# Patient Record
Sex: FEMALE | Race: WHITE | NOT HISPANIC OR LATINO | URBAN - METROPOLITAN AREA
[De-identification: names, ages, dates, MRNs, and addresses within clinical notes are randomized per-mention and may not be internally consistent; named-entity substitution may affect disease eponyms.]

---

## 2017-10-03 ENCOUNTER — INPATIENT (INPATIENT)
Facility: HOSPITAL | Age: 82
LOS: 6 days | Discharge: NURSING, REHAB OR LONG-TERM CARE | DRG: 683 | End: 2017-10-10
Attending: INTERNAL MEDICINE | Admitting: INTERNAL MEDICINE
Payer: COMMERCIAL

## 2017-10-03 VITALS
SYSTOLIC BLOOD PRESSURE: 114 MMHG | HEART RATE: 86 BPM | OXYGEN SATURATION: 96 % | DIASTOLIC BLOOD PRESSURE: 75 MMHG | TEMPERATURE: 98 F | HEIGHT: 64 IN | RESPIRATION RATE: 15 BRPM | WEIGHT: 179.9 LBS

## 2017-10-03 DIAGNOSIS — E11.649 TYPE 2 DIABETES MELLITUS WITH HYPOGLYCEMIA WITHOUT COMA: ICD-10-CM

## 2017-10-03 LAB
ALBUMIN SERPL ELPH-MCNC: 3.2 G/DL — LOW (ref 3.3–5)
ALP SERPL-CCNC: 26 U/L — LOW (ref 40–120)
ALT FLD-CCNC: 13 U/L — SIGNIFICANT CHANGE UP (ref 12–78)
ANION GAP SERPL CALC-SCNC: 6 MMOL/L — SIGNIFICANT CHANGE UP (ref 5–17)
APPEARANCE UR: CLEAR — SIGNIFICANT CHANGE UP
AST SERPL-CCNC: 23 U/L — SIGNIFICANT CHANGE UP (ref 15–37)
BACTERIA # UR AUTO: ABNORMAL
BASOPHILS # BLD AUTO: 0.1 K/UL — SIGNIFICANT CHANGE UP (ref 0–0.2)
BASOPHILS NFR BLD AUTO: 0.9 % — SIGNIFICANT CHANGE UP (ref 0–2)
BILIRUB SERPL-MCNC: 0.5 MG/DL — SIGNIFICANT CHANGE UP (ref 0.2–1.2)
BILIRUB UR-MCNC: NEGATIVE — SIGNIFICANT CHANGE UP
BUN SERPL-MCNC: 33 MG/DL — HIGH (ref 7–23)
CALCIUM SERPL-MCNC: 8.6 MG/DL — SIGNIFICANT CHANGE UP (ref 8.5–10.1)
CHLORIDE SERPL-SCNC: 111 MMOL/L — HIGH (ref 96–108)
CO2 SERPL-SCNC: 26 MMOL/L — SIGNIFICANT CHANGE UP (ref 22–31)
COLOR SPEC: YELLOW — SIGNIFICANT CHANGE UP
CREAT SERPL-MCNC: 3 MG/DL — HIGH (ref 0.5–1.3)
DIFF PNL FLD: ABNORMAL
EOSINOPHIL # BLD AUTO: 0 K/UL — SIGNIFICANT CHANGE UP (ref 0–0.5)
EOSINOPHIL NFR BLD AUTO: 0.4 % — SIGNIFICANT CHANGE UP (ref 0–6)
EPI CELLS # UR: ABNORMAL
GLUCOSE SERPL-MCNC: 178 MG/DL — HIGH (ref 70–99)
GLUCOSE UR QL: NEGATIVE — SIGNIFICANT CHANGE UP
HCT VFR BLD CALC: 32.6 % — LOW (ref 34.5–45)
HGB BLD-MCNC: 10.1 G/DL — LOW (ref 11.5–15.5)
KETONES UR-MCNC: NEGATIVE — SIGNIFICANT CHANGE UP
LACTATE SERPL-SCNC: 1.7 MMOL/L — SIGNIFICANT CHANGE UP (ref 0.7–2)
LEUKOCYTE ESTERASE UR-ACNC: ABNORMAL
LYMPHOCYTES # BLD AUTO: 1.6 K/UL — SIGNIFICANT CHANGE UP (ref 1–3.3)
LYMPHOCYTES # BLD AUTO: 18.3 % — SIGNIFICANT CHANGE UP (ref 13–44)
MCHC RBC-ENTMCNC: 30 PG — SIGNIFICANT CHANGE UP (ref 27–34)
MCHC RBC-ENTMCNC: 31 GM/DL — LOW (ref 32–36)
MCV RBC AUTO: 96.8 FL — SIGNIFICANT CHANGE UP (ref 80–100)
MONOCYTES # BLD AUTO: 0.4 K/UL — SIGNIFICANT CHANGE UP (ref 0–0.9)
MONOCYTES NFR BLD AUTO: 4.1 % — SIGNIFICANT CHANGE UP (ref 1–9)
NEUTROPHILS # BLD AUTO: 6.8 K/UL — SIGNIFICANT CHANGE UP (ref 1.8–7.4)
NEUTROPHILS NFR BLD AUTO: 76.2 % — SIGNIFICANT CHANGE UP (ref 43–77)
NITRITE UR-MCNC: NEGATIVE — SIGNIFICANT CHANGE UP
PH UR: 5 — SIGNIFICANT CHANGE UP (ref 5–8)
PLATELET # BLD AUTO: 260 K/UL — SIGNIFICANT CHANGE UP (ref 150–400)
POTASSIUM SERPL-MCNC: 5.1 MMOL/L — SIGNIFICANT CHANGE UP (ref 3.5–5.3)
POTASSIUM SERPL-SCNC: 5.1 MMOL/L — SIGNIFICANT CHANGE UP (ref 3.5–5.3)
PROT SERPL-MCNC: 6.8 G/DL — SIGNIFICANT CHANGE UP (ref 6–8.3)
PROT UR-MCNC: 25 MG/DL
RBC # BLD: 3.37 M/UL — LOW (ref 3.8–5.2)
RBC # FLD: 14.8 % — HIGH (ref 10.3–14.5)
RBC CASTS # UR COMP ASSIST: ABNORMAL /HPF (ref 0–4)
SODIUM SERPL-SCNC: 143 MMOL/L — SIGNIFICANT CHANGE UP (ref 135–145)
SP GR SPEC: 1.02 — SIGNIFICANT CHANGE UP (ref 1.01–1.02)
TSH SERPL-MCNC: 3.45 UIU/ML — SIGNIFICANT CHANGE UP (ref 0.36–3.74)
UROBILINOGEN FLD QL: NEGATIVE — SIGNIFICANT CHANGE UP
WBC # BLD: 8.9 K/UL — SIGNIFICANT CHANGE UP (ref 3.8–10.5)
WBC # FLD AUTO: 8.9 K/UL — SIGNIFICANT CHANGE UP (ref 3.8–10.5)
WBC UR QL: >50

## 2017-10-03 PROCEDURE — 99285 EMERGENCY DEPT VISIT HI MDM: CPT

## 2017-10-03 PROCEDURE — 71010: CPT | Mod: 26

## 2017-10-03 PROCEDURE — 99223 1ST HOSP IP/OBS HIGH 75: CPT

## 2017-10-03 RX ORDER — CEFTRIAXONE 500 MG/1
1 INJECTION, POWDER, FOR SOLUTION INTRAMUSCULAR; INTRAVENOUS ONCE
Qty: 0 | Refills: 0 | Status: COMPLETED | OUTPATIENT
Start: 2017-10-03 | End: 2017-10-03

## 2017-10-03 RX ORDER — DEXTROSE 50 % IN WATER 50 %
25 SYRINGE (ML) INTRAVENOUS ONCE
Qty: 0 | Refills: 0 | Status: DISCONTINUED | OUTPATIENT
Start: 2017-10-03 | End: 2017-10-10

## 2017-10-03 RX ORDER — SODIUM CHLORIDE 9 MG/ML
1000 INJECTION, SOLUTION INTRAVENOUS
Qty: 0 | Refills: 0 | Status: DISCONTINUED | OUTPATIENT
Start: 2017-10-03 | End: 2017-10-04

## 2017-10-03 RX ORDER — GLUCAGON INJECTION, SOLUTION 0.5 MG/.1ML
1 INJECTION, SOLUTION SUBCUTANEOUS ONCE
Qty: 0 | Refills: 0 | Status: DISCONTINUED | OUTPATIENT
Start: 2017-10-03 | End: 2017-10-10

## 2017-10-03 RX ORDER — CEFTRIAXONE 500 MG/1
1 INJECTION, POWDER, FOR SOLUTION INTRAMUSCULAR; INTRAVENOUS EVERY 24 HOURS
Qty: 0 | Refills: 0 | Status: DISCONTINUED | OUTPATIENT
Start: 2017-10-04 | End: 2017-10-06

## 2017-10-03 RX ORDER — DEXTROSE 50 % IN WATER 50 %
12.5 SYRINGE (ML) INTRAVENOUS ONCE
Qty: 0 | Refills: 0 | Status: DISCONTINUED | OUTPATIENT
Start: 2017-10-03 | End: 2017-10-10

## 2017-10-03 RX ORDER — INFLUENZA VIRUS VACCINE 15; 15; 15; 15 UG/.5ML; UG/.5ML; UG/.5ML; UG/.5ML
0.5 SUSPENSION INTRAMUSCULAR ONCE
Qty: 0 | Refills: 0 | Status: COMPLETED | OUTPATIENT
Start: 2017-10-03 | End: 2017-10-05

## 2017-10-03 RX ORDER — LACTOBACILLUS ACIDOPHILUS 100MM CELL
1 CAPSULE ORAL
Qty: 0 | Refills: 0 | Status: DISCONTINUED | OUTPATIENT
Start: 2017-10-03 | End: 2017-10-10

## 2017-10-03 RX ORDER — DEXTROSE 50 % IN WATER 50 %
1 SYRINGE (ML) INTRAVENOUS ONCE
Qty: 0 | Refills: 0 | Status: DISCONTINUED | OUTPATIENT
Start: 2017-10-03 | End: 2017-10-10

## 2017-10-03 RX ORDER — ENOXAPARIN SODIUM 100 MG/ML
30 INJECTION SUBCUTANEOUS DAILY
Qty: 0 | Refills: 0 | Status: DISCONTINUED | OUTPATIENT
Start: 2017-10-03 | End: 2017-10-10

## 2017-10-03 RX ORDER — INSULIN LISPRO 100/ML
VIAL (ML) SUBCUTANEOUS
Qty: 0 | Refills: 0 | Status: DISCONTINUED | OUTPATIENT
Start: 2017-10-03 | End: 2017-10-04

## 2017-10-03 RX ORDER — LEVOTHYROXINE SODIUM 125 MCG
88 TABLET ORAL DAILY
Qty: 0 | Refills: 0 | Status: DISCONTINUED | OUTPATIENT
Start: 2017-10-03 | End: 2017-10-10

## 2017-10-03 RX ADMIN — SODIUM CHLORIDE 200 MILLILITER(S): 9 INJECTION, SOLUTION INTRAVENOUS at 13:35

## 2017-10-03 RX ADMIN — CEFTRIAXONE 100 GRAM(S): 500 INJECTION, POWDER, FOR SOLUTION INTRAMUSCULAR; INTRAVENOUS at 14:44

## 2017-10-03 RX ADMIN — SODIUM CHLORIDE 50 MILLILITER(S): 9 INJECTION, SOLUTION INTRAVENOUS at 22:24

## 2017-10-03 RX ADMIN — SODIUM CHLORIDE 200 MILLILITER(S): 9 INJECTION, SOLUTION INTRAVENOUS at 22:20

## 2017-10-03 NOTE — ED PROVIDER NOTE - OBJECTIVE STATEMENT
84 yo F with hx DM p/w had episode of hypoglycemia yest. EMS treated and PT RMA to go to hospital. Now pt with hypoglycemia at home, felt weak cw her low blood sugars. Pt's daughter then gave pt another inj of insulin. EMS found pt with low blood sugar, improved with D50. Pt now feeling well. No cp/sob/palp. no abd pain. No n/v/d. No recent illness. No numb/ting/focal weak. No recent trauma. No fall today. No other inj or co.

## 2017-10-03 NOTE — ED PROVIDER NOTE - ENMT, MLM
Airway patent, Nasal mucosa clear. Mouth with normal mucosa. Throat has no vesicles, no oropharyngeal exudates and uvula is midline. No ext signs of trauma.

## 2017-10-03 NOTE — H&P ADULT - PROBLEM SELECTOR PROBLEM 10
Coronary artery disease involving transplanted heart, angina presence unspecified, unspecified vessel or lesion type

## 2017-10-03 NOTE — H&P ADULT - ASSESSMENT
86 yo f ,  , lives with dtr , in her own apartment , former smoker , hx of diabetes mellitus type 2 , osteoarthritis , hypertension , osteoporosis , obesity , cabg , ppm  patient has had an episode of hypoglycemia last Sunday ems called to house and was given glucagon , then today confused , and with change in mental status , and with low fs glucometer test and brought to er , given d50 and glucagon , , later in er alert and responsive but fuggy ,   she takes 25 units Lantus bid , has not been by md in months , has difficulty ambulating , no n/v , no cp , no sob , no head aches , no rash , no fever , no chills , no dysuria ,   in er also abnl gfr , and anemia , 86 yo f ,  , lives with dtr , in her own apartment , former smoker , hx of diabetes mellitus type 2 , osteoarthritis , hypertension , osteoporosis , obesity , cabg , ppm, and severe MR   patient has had an episode of hypoglycemia last Sunday ems called to house and was given glucagon , then today confused , and with change in mental status , and with low fs glucometer test and brought to er , given d50 and recovered  , later in er alert and responsive but fuggy ,   she takes 25 units Lantus bid , has not been by md in months , has difficulty ambulating , no n/v , no cp , no sob , no head aches , no rash , no fever , no chills , no dysuria ,   in er also abnl gfr , and anemia ,

## 2017-10-03 NOTE — ED PROVIDER NOTE - CARE PLAN
Principal Discharge DX:	Diabetic hypoglycemia  Secondary Diagnosis:	Urinary tract infection without hematuria, site unspecified

## 2017-10-03 NOTE — ED PROVIDER NOTE - PROGRESS NOTE DETAILS
Nirmal Pts family - pt unable to care for self at home, will need placement. Pt otherwise doing well at this time. Nirmal Najera, will admit for recurrent hypoglycemia / UTI.

## 2017-10-03 NOTE — ED ADULT NURSE NOTE - OBJECTIVE STATEMENT
Pt presents to the Ed via ambulance s/p hypoglycemia, given an amp of D50 PTA via IV access on the right forearm, NS infusing. Pt awake and responsive, + sensation, + capillary refill < 3 sec, moving all of her extremities independently.

## 2017-10-03 NOTE — ED PROVIDER NOTE - CHPI ED SYMPTOMS NEG
no shortness of breath/no chills/no headache/no vomiting/no decreased eating/drinking/no fever/no abdominal pain/no diarrhea/no rash

## 2017-10-03 NOTE — H&P ADULT - PROBLEM SELECTOR PROBLEM 3
Type 2 diabetes mellitus with other circulatory complication, unspecified long term insulin use status

## 2017-10-03 NOTE — H&P ADULT - HISTORY OF PRESENT ILLNESS
84 yo f ,  , lives with dtr , in her own apartment , former smoker , hx of diabetes mellitus type 2 , osteoarthritis , hypertension , osteoporosis , obesity , cabg , ppm  patient has had an episode of hypoglycemia last Sunday ems called to house and was given glucagon , then today confused , and with change in mental status , and with low fs glucometer test and brought to er , given d50 and glucagon , , later in er alert and responsive but fuggy ,   she takes 25 units Lantus bid , has not been by md in months , has difficulty ambulating , no n/v , no cp , no sob , no head aches , no rash , no fever , no chills , no dysuria , 86 yo f ,  , lives with dtr , in her own apartment , former smoker , hx of diabetes mellitus type 2 , osteoarthritis , hypertension , osteoporosis , obesity , cabg , ppm, and severe MR  patient has had an episode of hypoglycemia last Sunday ems called to house and was given glucagon and recovered ,  , then today confused , and with change in mental status ,no syncope , and with low fs glucometer test and brought to er , given d50 and  , later in er alert and responsive but fuggy , eating some cookies ,   she takes 25 units Lantus bid , has not been by md in months , has difficulty ambulating , no n/v , no cp , no sob , no head aches , no rash , no fever , no chills , no dysuria ,

## 2017-10-03 NOTE — H&P ADULT - NSHPSOCIALHISTORY_GEN_ALL_CORE
, lives with dtr , has a basement apt , difficulty walking , dtrs help out , has a walker , does do her own insulin , former smoker , no etoh ,   HCP and advance directives rev , and dtr will bring in papers

## 2017-10-03 NOTE — CONSULT NOTE ADULT - ASSESSMENT
85 F w hx of CAD s/p CABG, HTN, DM, HLD, mild ICM, severe MR, hypothyroid, PPM presents for near syncope/AMS.  - AMS likely from hypoglycemia.   - Cont current DM meds.  - Abx for UTI  - Appear mildly vol ol. Lasix 40mg IV x1  - Will need repeat echo  - PT does not want to pursue a mitral clip  - Possibly with new AF. Check 12 lead albina. Needs PPM interrogation (medtronic)  - observe on remote tele  - Monitor and replete electrolytes. Keep K>4.0 and Mg>2.0.  - Further cardiac workup will depend on clinical course.  - All other workup per primary team. Will followup. 85 F w hx of CAD s/p CABG, HTN, DM, HLD, mild ICM, severe MR, hypothyroid, PPM presents for near syncope/AMS.  - AMS likely from hypoglycemia.   - Cont current DM meds.  - Abx for UTI  - Appear mildly vol ol. Lasix 40mg IV x1  - Will need repeat echo  - PT does not want to pursue a mitral clip  - Possibly with new AF. Check 12 lead albina. Needs PPM interrogation (medtronic)...if does have AF, will likely need full dose ac  - observe on remote tele  - Monitor and replete electrolytes. Keep K>4.0 and Mg>2.0.  - Further cardiac workup will depend on clinical course.  - All other workup per primary team. Will followup.

## 2017-10-04 ENCOUNTER — TRANSCRIPTION ENCOUNTER (OUTPATIENT)
Age: 82
End: 2017-10-04

## 2017-10-04 DIAGNOSIS — I10 ESSENTIAL (PRIMARY) HYPERTENSION: ICD-10-CM

## 2017-10-04 DIAGNOSIS — N18.4 CHRONIC KIDNEY DISEASE, STAGE 4 (SEVERE): ICD-10-CM

## 2017-10-04 DIAGNOSIS — E03.2 HYPOTHYROIDISM DUE TO MEDICAMENTS AND OTHER EXOGENOUS SUBSTANCES: ICD-10-CM

## 2017-10-04 DIAGNOSIS — I25.811 ATHEROSCLEROSIS OF NATIVE CORONARY ARTERY OF TRANSPLANTED HEART WITHOUT ANGINA PECTORIS: ICD-10-CM

## 2017-10-04 DIAGNOSIS — E11.59 TYPE 2 DIABETES MELLITUS WITH OTHER CIRCULATORY COMPLICATIONS: ICD-10-CM

## 2017-10-04 DIAGNOSIS — E78.00 PURE HYPERCHOLESTEROLEMIA, UNSPECIFIED: ICD-10-CM

## 2017-10-04 DIAGNOSIS — N39.0 URINARY TRACT INFECTION, SITE NOT SPECIFIED: ICD-10-CM

## 2017-10-04 DIAGNOSIS — N17.9 ACUTE KIDNEY FAILURE, UNSPECIFIED: ICD-10-CM

## 2017-10-04 DIAGNOSIS — M19.90 UNSPECIFIED OSTEOARTHRITIS, UNSPECIFIED SITE: ICD-10-CM

## 2017-10-04 DIAGNOSIS — Z95.0 PRESENCE OF CARDIAC PACEMAKER: ICD-10-CM

## 2017-10-04 DIAGNOSIS — E11.649 TYPE 2 DIABETES MELLITUS WITH HYPOGLYCEMIA WITHOUT COMA: ICD-10-CM

## 2017-10-04 LAB
ANION GAP SERPL CALC-SCNC: 9 MMOL/L — SIGNIFICANT CHANGE UP (ref 5–17)
BASOPHILS # BLD AUTO: 0.1 K/UL — SIGNIFICANT CHANGE UP (ref 0–0.2)
BASOPHILS NFR BLD AUTO: 1 % — SIGNIFICANT CHANGE UP (ref 0–2)
BUN SERPL-MCNC: 27 MG/DL — HIGH (ref 7–23)
CALCIUM SERPL-MCNC: 7.9 MG/DL — LOW (ref 8.5–10.1)
CHLORIDE SERPL-SCNC: 113 MMOL/L — HIGH (ref 96–108)
CO2 SERPL-SCNC: 24 MMOL/L — SIGNIFICANT CHANGE UP (ref 22–31)
CREAT SERPL-MCNC: 2.4 MG/DL — HIGH (ref 0.5–1.3)
EOSINOPHIL # BLD AUTO: 0.2 K/UL — SIGNIFICANT CHANGE UP (ref 0–0.5)
EOSINOPHIL NFR BLD AUTO: 2.3 % — SIGNIFICANT CHANGE UP (ref 0–6)
FERRITIN SERPL-MCNC: 67 NG/ML — SIGNIFICANT CHANGE UP (ref 15–150)
GLUCOSE SERPL-MCNC: 146 MG/DL — HIGH (ref 70–99)
HCT VFR BLD CALC: 29.5 % — LOW (ref 34.5–45)
HGB BLD-MCNC: 8.8 G/DL — LOW (ref 11.5–15.5)
IRON SATN MFR SERPL: 15 % — SIGNIFICANT CHANGE UP (ref 14–50)
IRON SATN MFR SERPL: 54 UG/DL — SIGNIFICANT CHANGE UP (ref 30–160)
LYMPHOCYTES # BLD AUTO: 2.2 K/UL — SIGNIFICANT CHANGE UP (ref 1–3.3)
LYMPHOCYTES # BLD AUTO: 25.6 % — SIGNIFICANT CHANGE UP (ref 13–44)
MCHC RBC-ENTMCNC: 29.4 PG — SIGNIFICANT CHANGE UP (ref 27–34)
MCHC RBC-ENTMCNC: 29.7 GM/DL — LOW (ref 32–36)
MCV RBC AUTO: 98.8 FL — SIGNIFICANT CHANGE UP (ref 80–100)
MONOCYTES # BLD AUTO: 0.7 K/UL — SIGNIFICANT CHANGE UP (ref 0–0.9)
MONOCYTES NFR BLD AUTO: 7.7 % — SIGNIFICANT CHANGE UP (ref 1–9)
NEUTROPHILS # BLD AUTO: 5.4 K/UL — SIGNIFICANT CHANGE UP (ref 1.8–7.4)
NEUTROPHILS NFR BLD AUTO: 63.4 % — SIGNIFICANT CHANGE UP (ref 43–77)
PLATELET # BLD AUTO: 191 K/UL — SIGNIFICANT CHANGE UP (ref 150–400)
POTASSIUM SERPL-MCNC: 4.7 MMOL/L — SIGNIFICANT CHANGE UP (ref 3.5–5.3)
POTASSIUM SERPL-SCNC: 4.7 MMOL/L — SIGNIFICANT CHANGE UP (ref 3.5–5.3)
RBC # BLD: 2.99 M/UL — LOW (ref 3.8–5.2)
RBC # BLD: 3.05 M/UL — LOW (ref 3.8–5.2)
RBC # FLD: 14.7 % — HIGH (ref 10.3–14.5)
RETICS #: 61.3 K/UL — SIGNIFICANT CHANGE UP (ref 25–125)
RETICS/RBC NFR: 2 % — SIGNIFICANT CHANGE UP (ref 0.5–2.5)
SODIUM SERPL-SCNC: 146 MMOL/L — HIGH (ref 135–145)
T3 SERPL-MCNC: 65 NG/DL — LOW (ref 80–200)
T4 AB SER-ACNC: 8.7 UG/DL — SIGNIFICANT CHANGE UP (ref 4.6–12)
TIBC SERPL-MCNC: 361 UG/DL — SIGNIFICANT CHANGE UP (ref 220–430)
UIBC SERPL-MCNC: 307 UG/DL — SIGNIFICANT CHANGE UP (ref 110–370)
WBC # BLD: 8.6 K/UL — SIGNIFICANT CHANGE UP (ref 3.8–10.5)
WBC # FLD AUTO: 8.6 K/UL — SIGNIFICANT CHANGE UP (ref 3.8–10.5)

## 2017-10-04 PROCEDURE — 93306 TTE W/DOPPLER COMPLETE: CPT | Mod: 26

## 2017-10-04 PROCEDURE — 76775 US EXAM ABDO BACK WALL LIM: CPT | Mod: 26

## 2017-10-04 PROCEDURE — 99233 SBSQ HOSP IP/OBS HIGH 50: CPT

## 2017-10-04 PROCEDURE — 93970 EXTREMITY STUDY: CPT | Mod: 26

## 2017-10-04 RX ORDER — ATORVASTATIN CALCIUM 80 MG/1
10 TABLET, FILM COATED ORAL AT BEDTIME
Qty: 0 | Refills: 0 | Status: DISCONTINUED | OUTPATIENT
Start: 2017-10-04 | End: 2017-10-10

## 2017-10-04 RX ORDER — ASPIRIN/CALCIUM CARB/MAGNESIUM 324 MG
81 TABLET ORAL DAILY
Qty: 0 | Refills: 0 | Status: DISCONTINUED | OUTPATIENT
Start: 2017-10-04 | End: 2017-10-10

## 2017-10-04 RX ORDER — INSULIN LISPRO 100/ML
VIAL (ML) SUBCUTANEOUS
Qty: 0 | Refills: 0 | Status: DISCONTINUED | OUTPATIENT
Start: 2017-10-04 | End: 2017-10-05

## 2017-10-04 RX ORDER — FOLIC ACID 0.8 MG
1 TABLET ORAL DAILY
Qty: 0 | Refills: 0 | Status: DISCONTINUED | OUTPATIENT
Start: 2017-10-04 | End: 2017-10-10

## 2017-10-04 RX ORDER — FUROSEMIDE 40 MG
40 TABLET ORAL DAILY
Qty: 0 | Refills: 0 | Status: DISCONTINUED | OUTPATIENT
Start: 2017-10-04 | End: 2017-10-04

## 2017-10-04 RX ORDER — FUROSEMIDE 40 MG
40 TABLET ORAL ONCE
Qty: 0 | Refills: 0 | Status: COMPLETED | OUTPATIENT
Start: 2017-10-04 | End: 2017-10-04

## 2017-10-04 RX ORDER — LOSARTAN POTASSIUM 100 MG/1
50 TABLET, FILM COATED ORAL DAILY
Qty: 0 | Refills: 0 | Status: DISCONTINUED | OUTPATIENT
Start: 2017-10-04 | End: 2017-10-05

## 2017-10-04 RX ORDER — PREGABALIN 225 MG/1
1000 CAPSULE ORAL ONCE
Qty: 0 | Refills: 0 | Status: COMPLETED | OUTPATIENT
Start: 2017-10-04 | End: 2017-10-04

## 2017-10-04 RX ORDER — DOCUSATE SODIUM 100 MG
100 CAPSULE ORAL
Qty: 0 | Refills: 0 | Status: DISCONTINUED | OUTPATIENT
Start: 2017-10-04 | End: 2017-10-10

## 2017-10-04 RX ORDER — INSULIN LISPRO 100/ML
VIAL (ML) SUBCUTANEOUS AT BEDTIME
Qty: 0 | Refills: 0 | Status: DISCONTINUED | OUTPATIENT
Start: 2017-10-04 | End: 2017-10-05

## 2017-10-04 RX ORDER — SODIUM CHLORIDE 9 MG/ML
1000 INJECTION INTRAMUSCULAR; INTRAVENOUS; SUBCUTANEOUS
Qty: 0 | Refills: 0 | Status: DISCONTINUED | OUTPATIENT
Start: 2017-10-04 | End: 2017-10-05

## 2017-10-04 RX ORDER — ACETAMINOPHEN 500 MG
650 TABLET ORAL EVERY 6 HOURS
Qty: 0 | Refills: 0 | Status: DISCONTINUED | OUTPATIENT
Start: 2017-10-04 | End: 2017-10-10

## 2017-10-04 RX ADMIN — PREGABALIN 1000 MICROGRAM(S): 225 CAPSULE ORAL at 12:30

## 2017-10-04 RX ADMIN — ENOXAPARIN SODIUM 30 MILLIGRAM(S): 100 INJECTION SUBCUTANEOUS at 12:29

## 2017-10-04 RX ADMIN — Medication 1 TABLET(S): at 08:11

## 2017-10-04 RX ADMIN — ATORVASTATIN CALCIUM 10 MILLIGRAM(S): 80 TABLET, FILM COATED ORAL at 21:19

## 2017-10-04 RX ADMIN — Medication 100 MILLIGRAM(S): at 05:40

## 2017-10-04 RX ADMIN — Medication 88 MICROGRAM(S): at 05:40

## 2017-10-04 RX ADMIN — Medication 40 MILLIGRAM(S): at 12:35

## 2017-10-04 RX ADMIN — Medication 2: at 08:14

## 2017-10-04 RX ADMIN — Medication 1 TABLET(S): at 17:10

## 2017-10-04 RX ADMIN — Medication 81 MILLIGRAM(S): at 12:30

## 2017-10-04 RX ADMIN — Medication 1 MILLIGRAM(S): at 12:30

## 2017-10-04 RX ADMIN — CEFTRIAXONE 100 GRAM(S): 500 INJECTION, POWDER, FOR SOLUTION INTRAMUSCULAR; INTRAVENOUS at 15:01

## 2017-10-04 RX ADMIN — Medication 100 MILLIGRAM(S): at 18:40

## 2017-10-04 RX ADMIN — Medication 1: at 16:52

## 2017-10-04 RX ADMIN — SODIUM CHLORIDE 50 MILLILITER(S): 9 INJECTION INTRAMUSCULAR; INTRAVENOUS; SUBCUTANEOUS at 15:01

## 2017-10-04 RX ADMIN — LOSARTAN POTASSIUM 50 MILLIGRAM(S): 100 TABLET, FILM COATED ORAL at 05:40

## 2017-10-04 RX ADMIN — Medication 40 MILLIGRAM(S): at 02:49

## 2017-10-04 NOTE — PROGRESS NOTE ADULT - SUBJECTIVE AND OBJECTIVE BOX
HPI:  86 yo f ,  , lives with dtr , in her own apartment , former smoker , hx of diabetes mellitus type 2 , osteoarthritis , hypertension , osteoporosis , obesity , cabg , ppm, and severe MR  patient has had an episode of hypoglycemia last  ems called to house and was given glucagon and recovered ,  , then today confused , and with change in mental status ,no syncope , and with low fs glucometer test and brought to er , given d50 and  , later in er alert and responsive but fuggy , eating some cookies ,   she takes 25 units Lantus bid , has not been by md in months , has difficulty ambulating , no n/v , no cp , no sob , no head aches , no rash , no fever , no chills , no dysuria , (03 Oct 2017 23:55)      SUBJECTIVE:  Patient is a 85y old  Female who presents with a chief complaint of hypoglycemia , change in mental status (03 Oct 2017 23:55)          OBJECTIVE:  Review Of Systems:  Constitutional: [ ] Fever [ ] Chills [ ] Fatigue [ ] Weight change   HEENT: [ ] Blurred vision [ ] Eye Pain [ ] Headache [ ] Runny nose [ ] Sore Throat   Respiratory: [ ] Cough [ ] Wheezing [ ] Shortness of breath  Cardiovascular: [ ] Chest Pain [ ] Palpitations [ ] CHILDERS [ ] PND [ ] Orthopnea  Gastrointestinal: [ ] Abdominal Pain [ ] Diarrhea [ ] Constipation [ ] Hemorrhoids [ ] Nausea [ ] Vomiting  Genitourinary: [ ] Nocturia [ ] Dysuria [ ] Incontinence  Extremities: [ ] Swelling [ ] Joint Pain  Neurologic: [ ] Focal deficit [ ] Paresthesias [ ] Syncope  Lymphatic: [ ] Swelling [ ] Lymphadenopathy   Skin: [ ] Rash [ ] Ecchymoses [ ] Wounds [ ] Lesions  Psychiatry: [ ] Depression [ ] Suicidal/Homicidal Ideation [ ] Anxiety [ ] Sleep Disturbances  [ ] 10 point review of systems is otherwise negative except as mentioned above            [ ]Unable to obtain    Allergy:  Allergies    No Known Allergies    Intolerances        Medications:  MEDICATIONS  (STANDING):  aspirin  chewable 81 milliGRAM(s) Oral daily  atorvastatin 10 milliGRAM(s) Oral at bedtime  cefTRIAXone   IVPB 1 Gram(s) IV Intermittent every 24 hours  cyanocobalamin Injectable 1000 MICROGram(s) IntraMuscular once  dextrose 5%. 1000 milliLiter(s) (50 mL/Hr) IV Continuous <Continuous>  dextrose 50% Injectable 12.5 Gram(s) IV Push once  dextrose 50% Injectable 25 Gram(s) IV Push once  dextrose 50% Injectable 25 Gram(s) IV Push once  docusate sodium 100 milliGRAM(s) Oral two times a day  enoxaparin Injectable 30 milliGRAM(s) SubCutaneous daily  folic acid 1 milliGRAM(s) Oral daily  influenza   Vaccine 0.5 milliLiter(s) IntraMuscular once  insulin lispro (HumaLOG) corrective regimen sliding scale   SubCutaneous three times a day before meals  lactobacillus acidophilus 1 Tablet(s) Oral two times a day with meals  levothyroxine 88 MICROGram(s) Oral daily  losartan 50 milliGRAM(s) Oral daily    MEDICATIONS  (PRN):  acetaminophen   Tablet 650 milliGRAM(s) Oral every 6 hours PRN For Temp greater than 38 C (100.4 F)  dextrose Gel 1 Dose(s) Oral once PRN Blood Glucose LESS THAN 70 milliGRAM(s)/deciliter  glucagon  Injectable 1 milliGRAM(s) IntraMuscular once PRN Glucose LESS THAN 70 milligrams/deciliter      PMH/PSH/FH/SH: [ ] Unchanged    Vitals:  T(C): 36.9 (10-04-17 @ 04:28), Max: 37.1 (10-03-17 @ 13:20)  HR: 79 (10-04-17 @ 04:28) (76 - 87)  BP: 110/61 (10-04-17 @ 04:28) (110/61 - 129/73)  BP(mean): --  RR: 18 (10-04-17 @ 04:28) (15 - 18)  SpO2: 94% (10-04-17 @ 04:28) (94% - 98%)  Wt(kg): --  Daily Height in cm: 162.56 (03 Oct 2017 12:47)    Daily Weight in k.1 (03 Oct 2017 23:46)  I&O's Summary      Labs:                        8.8    8.6   )-----------( 191      ( 04 Oct 2017 07:47 )             29.5     10-    146<H>  |  113<H>  |  27<H>  ----------------------------<  146<H>  4.7   |  24  |  2.40<H>    Ca    7.9<L>      04 Oct 2017 07:47    TPro  6.8  /  Alb  3.2<L>  /  TBili  0.5  /  DBili  x   /  AST  23  /  ALT  13  /  AlkPhos  26<L>  10-03          Lactate, Blood: 1.7 mmol/L (10-03 @ 13:43)              ECG:    Echo:  < from: Transthoracic Echocardiogram (16 @ 14:34) >  Patient name: LEANA CR  YOB: 1931   Age: 84 (F)   MR#: [id number]  Study Date: 2016  Location: O/PSonographer: Jazmín Ngo RDCS  Study quality: Technically good  Referring Physician: MIRTHA FORD MD  Blood Pressure: 106/70mmHg  Height: 5ft 1in  Weight: 188 lb  BSA: 1.8 m2  ------------------------------------------------------------------------  PROCEDURE: Transthoracic echocardiogram with 2-D, M-Mode  and complete spectral and color flow Doppler.  INDICATION: Nonrheumatic mitral (valve) insufficiency  (I34.0)  ------------------------------------------------------------------------  Dimensions:    Normal Values:  LA:     4.3    2.0 - 4.0 cm  Ao:     3.1    2.0 - 3.8 cm  SEPTUM: 1.1    0.6 - 1.2 cm  PWT:    1.00.6 - 1.1 cm  LVIDd:  5.2    3.0 - 5.6 cm  LVIDs:  4.0    1.8 - 4.0 cm  Derived variables:  LVMI: 113 g/m2  RWT: 0.38  EF (Visual Estimate): 35 %  ------------------------------------------------------------------------  Observations:  Mitral Valve: Mild mitral annular calcification. Tethered  mitral valve leaflets with normal opening. Moderate mitral  regurgitation.  Aortic Valve/Aorta: Calcified trileaflet aortic valve with  normal opening. Mild aortic regurgitation.  Normal aortic root.  Left Atrium: Mildly dilated left atrium.  LA volume index =  35 cc/m2.  Left Ventricle: Moderate global left ventricular systolic  dysfunction. EF=35% by visual estimation. Septal motion  consistent with paced rythm. Eccentric left ventricular  hypertrophy(dilated left ventricle with normal relative  wall thickness). Mild diastolic dysfunction (Stage I).  Right Heart: Normal right atrium. Normal right ventricular  size and function. A device wire is noted in the right  heart. Normal tricuspid valve. Moderate tricuspid  regurgitation. Normal pulmonic valve. Minimal pulmonic  regurgitation.  Pericardium/Pleura: Normal pericardium with no pericardial  effusion.  Hemodynamic: Estimated right atrial pressure is 8 mm Hg.  Estimated right ventricular systolic pressure equals 29 mm  Hg, assuming right atrial pressure equals 8 mm Hg,  consistent with normal pulmonary pressures.  ------------------------------------------------------------------------  Conclusions:  1. Mild mitral annular calcification.Tethered mitral valve  leaflets with normal opening. Moderate mitral  regurgitation.  2. Calcified trileaflet aortic valve with normal opening.  Mild aortic regurgitation.  3. Eccentric left ventricular hypertrophy (dilated left  ventricle with normal relative wall thickness).  4. Moderate global left ventricular systolic dysfunction.  EF=35% by visual estimation. Septal motion consistent with  paced rhythm.  5. Mild diastolic dysfunction (Stage I).  6. Normal right ventricular size and function. A device  wire is noted in the right heart.  Patient was scheduled for a CHELSEA which was not performed  because of a loose front tooth. CHELSEA to be rescheduled  pending dental evaluation.  ------------------------------------------------------------------------  Confirmed on  2016 - 11:28:19 by Richy Duckworth M.D.  ------------------------------------------------------------------------    < end of copied text >    Stress Testing:     Cath:   < from: Cardiac Cath Lab (12.03.10 @ 08:52) >  [facility]  Department of Cardiology  [address] [place] [address]  [phone]  Cath Lab Report -- Comprehensive Report  Patient: LEANA CR  Study date: 2010  Account number: [id number]  MR number: [id number]  : 1931  Gender: Female  Race: W  Case Physician(s):  Axel Lincoln M.D.  Referring Physician:  Michelle Carson M.D.  Indications: Angina/MI: stable angina.  History: The patient has a history of coronary artery disease. The patient  has hypertension, oral hypoglycemic-treated diabetes, and renal failure.  Prior cardiovascular procedures: Coronary bypass.  Procedure:  Left coronary angiography.  Right coronary angiography.  Bypass graft angiography.  Left heart catheterization.  Technique: The risks and alternatives of the procedures and conscious  sedation were explained to the patient and informed consent was obtained.  Cardiac catheterization performed electively.  Right femoral artery access. Left coronary artery angiography. Thevessel  was injected utilizing a 5FR FL4.0 EXPO catheter. Right coronary artery  angiography. The vessel was injected utilizing a 5FR FR4.0 EXPO catheter.  Graft angiography. The vessel was injected utilizing a 5FR FR4.0 EXPO  catheter. For proper position, 4FR SRC catheter(s) were also used. Left  heart catheterization. Contrast given: Visipaque 37 ml. Fluoroscopy time:  6.8 min.  Medications given: Lidocaine 1 percent, SC.  Ventricles: No left ventriculogram was performed. EF was not assessed.  Coronary vessels: The coronary circulation is right dominant.  LM:      LM: Normal.  LAD:      Mid LAD: There was a 100 % stenosis.  Distal LAD: There was a 70 % stenosis distal to the graft anastomosis.  CX:      OM1: There was a 80 % stenosis.  RCA:      Mid RCA: There was a 40 % stenosis.  GRAFTS:      Graft to the mid LAD: The graft was a LIMA. Graft angiography  showed no evidence of disease.  Graft to the 1st obtuse marginal: The graft was a saphenous vein graft from  the aorta. It was occluded.  Graft to the distal RCA: The graft was a saphenous vein graft from the  aorta. It was occluded.  Complications: There were no complications.  Recommendations:  Due to renal insufficiency and diabetes, limited diagnostic study performed  with 35cc contrast.  Native coronary disease is amenable to PCI, however, carries the risk of  contrast induced nephropathy because of the additional contrast needed.  Prepared and signed by  Axel Lincoln M.D.  Signed 2010 13:15:32  Hemodynamic tables  Pressures:  Baseline/ Room Air  Pressures:  - HR: 57  Pressures:  - Rhythm:  Pressures:  -- Aortic Pressure (S/D/M): 126/51/78  Pressures:  -- Left Ventricle (s/edp): 120/10/--  Pressures:  Post Angio  Pressures:  - HR: 58  Pressures:  - Rhythm:  Pressures:  -- Aortic Pressure (S/D/M): 120/50/75  Pressures:  -- Left Ventricle (s/edp): 121/10/--  Outputs:  Baseline/ Room Air  Outputs:  -- CALCULATIONS[age]  Outputs:  -- CALCULATIONS: Body Surface Area: 1.99  Outputs:  -- CALCULATIONS: Height in cm: 155.00  Outputs:  -- CALCULATIONS: Sex: Female  Outputs:  -- CALCULATIONS: Weight in k.10    < end of copied text >      Imaging:    Interpretation of Telemetry:      Physical Exam:  Appearance: [x ] Normal  [ ] abnormal [x ] NAD with mild dyspnea  Eyes: [x ] PERRL [ ] EOMI  HENT: [x ] Normal [x ] pt with hematoma to Left side and tip of tongue (states bit her tongue) with no airway impingement [ x]NC/AT  Cardiovascular: [ x] S1 [x ] S2 [ ] RRR [x ] 3-4/6 SM [x ]edema Left peripheral LE 2-3 +> right  1+[ ] JVP  Procedural Access Site: [ ]  hematoma [ ] tender to palpation [ ] 2+ pulse [ ] bruit [ ] Ecchymosis  Respiratory: [ ] B/L basal rales L>R  Gastrointestinal: [x ] Soft [ ] tenderness[ ] distension [x ] BS  Musculoskeletal: [ ] clubbing [ ] joint deformity   Neurologic: [x ] Non-focal  Lymphatic: [ ] lymphadenopathy  Psychiatry: [x ] AAOx3  [ ] confused [ ] disoriented [x ] Mood & affect appropriate  Skin: [ ]  rashes [ ] ecchymoses [ ] cyanosis HPI:  84 yo f ,  , lives with dtr , in her own apartment , former smoker , hx of diabetes mellitus type 2 , osteoarthritis , hypertension , osteoporosis , obesity , cabg , ppm, and severe MR  patient has had an episode of hypoglycemia last  ems called to house and was given glucagon and recovered ,  , then today confused , and with change in mental status ,no syncope , and with low fs glucometer test and brought to er , given d50 and  , later in er alert and responsive but fuggy , eating some cookies ,   she takes 25 units Lantus bid , has not been by md in months , has difficulty ambulating , no n/v , no cp , no sob , no head aches , no rash , no fever , no chills , no dysuria , (03 Oct 2017 23:55)      SUBJECTIVE:  Patient is a 85y old  Female who presents with a chief complaint of hypoglycemia , change in mental status (03 Oct 2017 23:55)          OBJECTIVE:  Review Of Systems:  Constitutional: [ ] Fever [ ] Chills [ ] Fatigue [ ] Weight change   HEENT: [ ] Blurred vision [ ] Eye Pain [ ] Headache [ ] Runny nose [ ] Sore Throat   Respiratory: [ ] Cough [ ] Wheezing [x ] Shortness of breath mild  Cardiovascular: [ ] Chest Pain [ ] Palpitations [ ] CHILDERS [ ] PND [ ] Orthopnea  Gastrointestinal: [ ] Abdominal Pain [ ] Diarrhea [ ] Constipation [ ] Hemorrhoids [ ] Nausea [ ] Vomiting  Genitourinary: [ ] Nocturia [ ] Dysuria [ ] Incontinence  Extremities: [x ] Swelling [ ] Joint Pain  Neurologic: [ ] Focal deficit [ ] Paresthesias [ ] Syncope  Lymphatic: [ ] Swelling [ ] Lymphadenopathy   Skin: [ ] Rash [ ] Ecchymoses [ ] Wounds [ ] Lesions  Psychiatry: [ ] Depression [ ] Suicidal/Homicidal Ideation [ ] Anxiety [ ] Sleep Disturbances  [ x] 10 point review of systems is otherwise negative except as mentioned above            [ ]Unable to obtain    Allergy:  Allergies    No Known Allergies    Intolerances        Medications:  MEDICATIONS  (STANDING):  aspirin  chewable 81 milliGRAM(s) Oral daily  atorvastatin 10 milliGRAM(s) Oral at bedtime  cefTRIAXone   IVPB 1 Gram(s) IV Intermittent every 24 hours  cyanocobalamin Injectable 1000 MICROGram(s) IntraMuscular once  dextrose 5%. 1000 milliLiter(s) (50 mL/Hr) IV Continuous <Continuous>  dextrose 50% Injectable 12.5 Gram(s) IV Push once  dextrose 50% Injectable 25 Gram(s) IV Push once  dextrose 50% Injectable 25 Gram(s) IV Push once  docusate sodium 100 milliGRAM(s) Oral two times a day  enoxaparin Injectable 30 milliGRAM(s) SubCutaneous daily  folic acid 1 milliGRAM(s) Oral daily  influenza   Vaccine 0.5 milliLiter(s) IntraMuscular once  insulin lispro (HumaLOG) corrective regimen sliding scale   SubCutaneous three times a day before meals  lactobacillus acidophilus 1 Tablet(s) Oral two times a day with meals  levothyroxine 88 MICROGram(s) Oral daily  losartan 50 milliGRAM(s) Oral daily    MEDICATIONS  (PRN):  acetaminophen   Tablet 650 milliGRAM(s) Oral every 6 hours PRN For Temp greater than 38 C (100.4 F)  dextrose Gel 1 Dose(s) Oral once PRN Blood Glucose LESS THAN 70 milliGRAM(s)/deciliter  glucagon  Injectable 1 milliGRAM(s) IntraMuscular once PRN Glucose LESS THAN 70 milligrams/deciliter      PMH/PSH/FH/SH: [ ] Unchanged    Vitals:  T(C): 36.9 (10-04-17 @ 04:28), Max: 37.1 (10-03-17 @ 13:20)  HR: 79 (10-04-17 @ 04:28) (76 - 87)  BP: 110/61 (10-04-17 @ 04:28) (110/61 - 129/73)  BP(mean): --  RR: 18 (10-04-17 @ 04:28) (15 - 18)  SpO2: 94% (10-04-17 @ 04:28) (94% - 98%)  Wt(kg): --  Daily Height in cm: 162.56 (03 Oct 2017 12:47)    Daily Weight in k.1 (03 Oct 2017 23:46)  I&O's Summary      Labs:                        8.8    8.6   )-----------( 191      ( 04 Oct 2017 07:47 )             29.5     10-    146<H>  |  113<H>  |  27<H>  ----------------------------<  146<H>  4.7   |  24  |  2.40<H>    Ca    7.9<L>      04 Oct 2017 07:47    TPro  6.8  /  Alb  3.2<L>  /  TBili  0.5  /  DBili  x   /  AST  23  /  ALT  13  /  AlkPhos  26<L>  10-03          Lactate, Blood: 1.7 mmol/L (10-03 @ 13:43)              ECG:    Echo:  < from: Transthoracic Echocardiogram (16 @ 14:34) >  Patient name: LEANA CR  YOB: 1931   Age: 84 (F)   MR#: [id number]  Study Date: 2016  Location: O/PSonographer: Jazmín Ngo RDCS  Study quality: Technically good  Referring Physician: MIRTHA FORD MD  Blood Pressure: 106/70mmHg  Height: 5ft 1in  Weight: 188 lb  BSA: 1.8 m2  ------------------------------------------------------------------------  PROCEDURE: Transthoracic echocardiogram with 2-D, M-Mode  and complete spectral and color flow Doppler.  INDICATION: Nonrheumatic mitral (valve) insufficiency  (I34.0)  ------------------------------------------------------------------------  Dimensions:    Normal Values:  LA:     4.3    2.0 - 4.0 cm  Ao:     3.1    2.0 - 3.8 cm  SEPTUM: 1.1    0.6 - 1.2 cm  PWT:    1.00.6 - 1.1 cm  LVIDd:  5.2    3.0 - 5.6 cm  LVIDs:  4.0    1.8 - 4.0 cm  Derived variables:  LVMI: 113 g/m2  RWT: 0.38  EF (Visual Estimate): 35 %  ------------------------------------------------------------------------  Observations:  Mitral Valve: Mild mitral annular calcification. Tethered  mitral valve leaflets with normal opening. Moderate mitral  regurgitation.  Aortic Valve/Aorta: Calcified trileaflet aortic valve with  normal opening. Mild aortic regurgitation.  Normal aortic root.  Left Atrium: Mildly dilated left atrium.  LA volume index =  35 cc/m2.  Left Ventricle: Moderate global left ventricular systolic  dysfunction. EF=35% by visual estimation. Septal motion  consistent with paced rythm. Eccentric left ventricular  hypertrophy(dilated left ventricle with normal relative  wall thickness). Mild diastolic dysfunction (Stage I).  Right Heart: Normal right atrium. Normal right ventricular  size and function. A device wire is noted in the right  heart. Normal tricuspid valve. Moderate tricuspid  regurgitation. Normal pulmonic valve. Minimal pulmonic  regurgitation.  Pericardium/Pleura: Normal pericardium with no pericardial  effusion.  Hemodynamic: Estimated right atrial pressure is 8 mm Hg.  Estimated right ventricular systolic pressure equals 29 mm  Hg, assuming right atrial pressure equals 8 mm Hg,  consistent with normal pulmonary pressures.  ------------------------------------------------------------------------  Conclusions:  1. Mild mitral annular calcification.Tethered mitral valve  leaflets with normal opening. Moderate mitral  regurgitation.  2. Calcified trileaflet aortic valve with normal opening.  Mild aortic regurgitation.  3. Eccentric left ventricular hypertrophy (dilated left  ventricle with normal relative wall thickness).  4. Moderate global left ventricular systolic dysfunction.  EF=35% by visual estimation. Septal motion consistent with  paced rhythm.  5. Mild diastolic dysfunction (Stage I).  6. Normal right ventricular size and function. A device  wire is noted in the right heart.  Patient was scheduled for a CHELSEA which was not performed  because of a loose front tooth. CHELSEA to be rescheduled  pending dental evaluation.  ------------------------------------------------------------------------  Confirmed on  2016 - 11:28:19 by Richy Duckworth M.D.  ------------------------------------------------------------------------    < end of copied text >    Stress Testing:     Cath:   < from: Cardiac Cath Lab (12.03.10 @ 08:52) >  [facility]  Department of Cardiology  [address] [place] [address]  [phone]  Cath Lab Report -- Comprehensive Report  Patient: LEANA CR  Study date: 2010  Account number: [id number]  MR number: [id number]  : 1931  Gender: Female  Race: W  Case Physician(s):  Axel Lincoln M.D.  Referring Physician:  Michelle Carson M.D.  Indications: Angina/MI: stable angina.  History: The patient has a history of coronary artery disease. The patient  has hypertension, oral hypoglycemic-treated diabetes, and renal failure.  Prior cardiovascular procedures: Coronary bypass.  Procedure:  Left coronary angiography.  Right coronary angiography.  Bypass graft angiography.  Left heart catheterization.  Technique: The risks and alternatives of the procedures and conscious  sedation were explained to the patient and informed consent was obtained.  Cardiac catheterization performed electively.  Right femoral artery access. Left coronary artery angiography. Thevessel  was injected utilizing a 5FR FL4.0 EXPO catheter. Right coronary artery  angiography. The vessel was injected utilizing a 5FR FR4.0 EXPO catheter.  Graft angiography. The vessel was injected utilizing a 5FR FR4.0 EXPO  catheter. For proper position, 4FR SRC catheter(s) were also used. Left  heart catheterization. Contrast given: Visipaque 37 ml. Fluoroscopy time:  6.8 min.  Medications given: Lidocaine 1 percent, SC.  Ventricles: No left ventriculogram was performed. EF was not assessed.  Coronary vessels: The coronary circulation is right dominant.  LM:      LM: Normal.  LAD:      Mid LAD: There was a 100 % stenosis.  Distal LAD: There was a 70 % stenosis distal to the graft anastomosis.  CX:      OM1: There was a 80 % stenosis.  RCA:      Mid RCA: There was a 40 % stenosis.  GRAFTS:      Graft to the mid LAD: The graft was a LIMA. Graft angiography  showed no evidence of disease.  Graft to the 1st obtuse marginal: The graft was a saphenous vein graft from  the aorta. It was occluded.  Graft to the distal RCA: The graft was a saphenous vein graft from the  aorta. It was occluded.  Complications: There were no complications.  Recommendations:  Due to renal insufficiency and diabetes, limited diagnostic study performed  with 35cc contrast.  Native coronary disease is amenable to PCI, however, carries the risk of  contrast induced nephropathy because of the additional contrast needed.  Prepared and signed by  Axel Lincoln M.D.  Signed 2010 13:15:32  Hemodynamic tables  Pressures:  Baseline/ Room Air  Pressures:  - HR: 57  Pressures:  - Rhythm:  Pressures:  -- Aortic Pressure (S/D/M): 126/51/78  Pressures:  -- Left Ventricle (s/edp): 120/10/--  Pressures:  Post Angio  Pressures:  - HR: 58  Pressures:  - Rhythm:  Pressures:  -- Aortic Pressure (S/D/M): 120/50/75  Pressures:  -- Left Ventricle (s/edp): 121/10/--  Outputs:  Baseline/ Room Air  Outputs:  -- CALCULATIONS: [age]  Outputs:  -- CALCULATIONS: Body Surface Area: 1.99  Outputs:  -- CALCULATIONS: Height in cm: 155.00  Outputs:  -- CALCULATIONS: Sex: Female  Outputs:  -- CALCULATIONS: Weight in k.10    < end of copied text >      Imaging:    Interpretation of Telemetry:      Physical Exam:  Appearance: [x ] Normal  [ ] abnormal [x ] NAD with mild dyspnea  Eyes: [x ] PERRL [ ] EOMI  HENT: [x ] Normal [x ] pt with hematoma to Left side and tip of tongue (states bit her tongue) with no airway impingement [ x]NC/AT  Cardiovascular: [ x] S1 [x ] S2 [ ] RRR [x ] 3-4/6 SM [x ]edema Left peripheral LE 2-3 +> right  1+[ ] JVP  Procedural Access Site: [ ]  hematoma [ ] tender to palpation [ ] 2+ pulse [ ] bruit [ ] Ecchymosis  Respiratory: [ ] B/L basal rales L>R  Gastrointestinal: [x ] Soft [ ] tenderness[ ] distension [x ] BS  Musculoskeletal: [ ] clubbing [ ] joint deformity   Neurologic: [x ] Non-focal  Lymphatic: [ ] lymphadenopathy  Psychiatry: [x ] AAOx3  [ ] confused [ ] disoriented [x ] Mood & affect appropriate  Skin: [ ]  rashes [ ] ecchymoses [ ] cyanosis HPI:  84 yo f ,  , lives with dtr , in her own apartment , former smoker , hx of diabetes mellitus type 2 , osteoarthritis , hypertension , osteoporosis , obesity , cabg , ppm, and severe MR  patient has had an episode of hypoglycemia last  ems called to house and was given glucagon and recovered ,  , then today confused , and with change in mental status ,no syncope , and with low fs glucometer test and brought to er , given d50 and  , later in er alert and responsive but fuggy , eating some cookies ,   she takes 25 units Lantus bid , has not been by md in months , has difficulty ambulating , no n/v , no cp , no sob , no head aches , no rash , no fever , no chills , no dysuria , (03 Oct 2017 23:55)      SUBJECTIVE:  Patient is a 85y old  Female who presents with a chief complaint of hypoglycemia , change in mental status (03 Oct 2017 23:55)          OBJECTIVE:  Review Of Systems:  Constitutional: [ ] Fever [ ] Chills [ ] Fatigue [ ] Weight change   HEENT: [ ] Blurred vision [ ] Eye Pain [ ] Headache [ ] Runny nose [ ] Sore Throat   Respiratory: [ ] Cough [ ] Wheezing [x ] Shortness of breath mild  Cardiovascular: [ ] Chest Pain [ ] Palpitations [ ] CHILDERS [ ] PND [ ] Orthopnea  Gastrointestinal: [ ] Abdominal Pain [ ] Diarrhea [ ] Constipation [ ] Hemorrhoids [ ] Nausea [ ] Vomiting  Genitourinary: [ ] Nocturia [ ] Dysuria [ ] Incontinence  Extremities: [x ] Swelling [ ] Joint Pain  Neurologic: [ ] Focal deficit [ ] Paresthesias [ ] Syncope  Lymphatic: [ ] Swelling [ ] Lymphadenopathy   Skin: [ ] Rash [ ] Ecchymoses [ ] Wounds [ ] Lesions  Psychiatry: [ ] Depression [ ] Suicidal/Homicidal Ideation [ ] Anxiety [ ] Sleep Disturbances  [ x] 10 point review of systems is otherwise negative except as mentioned above            [ ]Unable to obtain    Allergy:  Allergies    No Known Allergies    Intolerances        Medications:  MEDICATIONS  (STANDING):  aspirin  chewable 81 milliGRAM(s) Oral daily  atorvastatin 10 milliGRAM(s) Oral at bedtime  cefTRIAXone   IVPB 1 Gram(s) IV Intermittent every 24 hours  cyanocobalamin Injectable 1000 MICROGram(s) IntraMuscular once  dextrose 5%. 1000 milliLiter(s) (50 mL/Hr) IV Continuous <Continuous>  dextrose 50% Injectable 12.5 Gram(s) IV Push once  dextrose 50% Injectable 25 Gram(s) IV Push once  dextrose 50% Injectable 25 Gram(s) IV Push once  docusate sodium 100 milliGRAM(s) Oral two times a day  enoxaparin Injectable 30 milliGRAM(s) SubCutaneous daily  folic acid 1 milliGRAM(s) Oral daily  influenza   Vaccine 0.5 milliLiter(s) IntraMuscular once  insulin lispro (HumaLOG) corrective regimen sliding scale   SubCutaneous three times a day before meals  lactobacillus acidophilus 1 Tablet(s) Oral two times a day with meals  levothyroxine 88 MICROGram(s) Oral daily  losartan 50 milliGRAM(s) Oral daily    MEDICATIONS  (PRN):  acetaminophen   Tablet 650 milliGRAM(s) Oral every 6 hours PRN For Temp greater than 38 C (100.4 F)  dextrose Gel 1 Dose(s) Oral once PRN Blood Glucose LESS THAN 70 milliGRAM(s)/deciliter  glucagon  Injectable 1 milliGRAM(s) IntraMuscular once PRN Glucose LESS THAN 70 milligrams/deciliter      PMH/PSH/FH/SH: [ ] Unchanged    Vitals:  T(C): 36.9 (10-04-17 @ 04:28), Max: 37.1 (10-03-17 @ 13:20)  HR: 79 (10-04-17 @ 04:28) (76 - 87)  BP: 110/61 (10-04-17 @ 04:28) (110/61 - 129/73)  BP(mean): --  RR: 18 (10-04-17 @ 04:28) (15 - 18)  SpO2: 94% (10-04-17 @ 04:28) (94% - 98%)  Wt(kg): --  Daily Height in cm: 162.56 (03 Oct 2017 12:47)    Daily Weight in k.1 (03 Oct 2017 23:46)  I&O's Summary      Labs:                        8.8    8.6   )-----------( 191      ( 04 Oct 2017 07:47 )             29.5     10-    146<H>  |  113<H>  |  27<H>  ----------------------------<  146<H>  4.7   |  24  |  2.40<H>    Ca    7.9<L>      04 Oct 2017 07:47    TPro  6.8  /  Alb  3.2<L>  /  TBili  0.5  /  DBili  x   /  AST  23  /  ALT  13  /  AlkPhos  26<L>  10-03          Lactate, Blood: 1.7 mmol/L (10-03 @ 13:43)              ECG:    Echo:  < from: Transthoracic Echocardiogram (16 @ 14:34) >  Patient name: LEANA CR  YOB: 1931   Age: 84 (F)   MR#: [id number]  Study Date: 2016  Location: O/PSonographer: Jazmín Ngo RDCS  Study quality: Technically good  Referring Physician: MIRTHA FORD MD  Blood Pressure: 106/70mmHg  Height: 5ft 1in  Weight: 188 lb  BSA: 1.8 m2  ------------------------------------------------------------------------  PROCEDURE: Transthoracic echocardiogram with 2-D, M-Mode  and complete spectral and color flow Doppler.  INDICATION: Nonrheumatic mitral (valve) insufficiency  (I34.0)  ------------------------------------------------------------------------  Dimensions:    Normal Values:  LA:     4.3    2.0 - 4.0 cm  Ao:     3.1    2.0 - 3.8 cm  SEPTUM: 1.1    0.6 - 1.2 cm  PWT:    1.00.6 - 1.1 cm  LVIDd:  5.2    3.0 - 5.6 cm  LVIDs:  4.0    1.8 - 4.0 cm  Derived variables:  LVMI: 113 g/m2  RWT: 0.38  EF (Visual Estimate): 35 %  ------------------------------------------------------------------------  Observations:  Mitral Valve: Mild mitral annular calcification. Tethered  mitral valve leaflets with normal opening. Moderate mitral  regurgitation.  Aortic Valve/Aorta: Calcified trileaflet aortic valve with  normal opening. Mild aortic regurgitation.  Normal aortic root.  Left Atrium: Mildly dilated left atrium.  LA volume index =  35 cc/m2.  Left Ventricle: Moderate global left ventricular systolic  dysfunction. EF=35% by visual estimation. Septal motion  consistent with paced rythm. Eccentric left ventricular  hypertrophy(dilated left ventricle with normal relative  wall thickness). Mild diastolic dysfunction (Stage I).  Right Heart: Normal right atrium. Normal right ventricular  size and function. A device wire is noted in the right  heart. Normal tricuspid valve. Moderate tricuspid  regurgitation. Normal pulmonic valve. Minimal pulmonic  regurgitation.  Pericardium/Pleura: Normal pericardium with no pericardial  effusion.  Hemodynamic: Estimated right atrial pressure is 8 mm Hg.  Estimated right ventricular systolic pressure equals 29 mm  Hg, assuming right atrial pressure equals 8 mm Hg,  consistent with normal pulmonary pressures.  ------------------------------------------------------------------------  Conclusions:  1. Mild mitral annular calcification.Tethered mitral valve  leaflets with normal opening. Moderate mitral  regurgitation.  2. Calcified trileaflet aortic valve with normal opening.  Mild aortic regurgitation.  3. Eccentric left ventricular hypertrophy (dilated left  ventricle with normal relative wall thickness).  4. Moderate global left ventricular systolic dysfunction.  EF=35% by visual estimation. Septal motion consistent with  paced rhythm.  5. Mild diastolic dysfunction (Stage I).  6. Normal right ventricular size and function. A device  wire is noted in the right heart.  Patient was scheduled for a CHELSEA which was not performed  because of a loose front tooth. CHELSEA to be rescheduled  pending dental evaluation.  ------------------------------------------------------------------------  Confirmed on  2016 - 11:28:19 by Richy Duckworth M.D.  ------------------------------------------------------------------------    < end of copied text >    Stress Testing:     Cath:   < from: Cardiac Cath Lab (12.03.10 @ 08:52) >  [facility]  Department of Cardiology  [address] [place] [address]  [phone]  Cath Lab Report -- Comprehensive Report  Patient: LEANA CR  Study date: 2010  Account number: [id number]  MR number: [id number]  : 1931  Gender: Female  Race: W  Case Physician(s):  Axel Lincoln M.D.  Referring Physician:  Michelle Carson M.D.  Indications: Angina/MI: stable angina.  History: The patient has a history of coronary artery disease. The patient  has hypertension, oral hypoglycemic-treated diabetes, and renal failure.  Prior cardiovascular procedures: Coronary bypass.  Procedure:  Left coronary angiography.  Right coronary angiography.  Bypass graft angiography.  Left heart catheterization.  Technique: The risks and alternatives of the procedures and conscious  sedation were explained to the patient and informed consent was obtained.  Cardiac catheterization performed electively.  Right femoral artery access. Left coronary artery angiography. Thevessel  was injected utilizing a 5FR FL4.0 EXPO catheter. Right coronary artery  angiography. The vessel was injected utilizing a 5FR FR4.0 EXPO catheter.  Graft angiography. The vessel was injected utilizing a 5FR FR4.0 EXPO  catheter. For proper position, 4FR SRC catheter(s) were also used. Left  heart catheterization. Contrast given: Visipaque 37 ml. Fluoroscopy time:  6.8 min.  Medications given: Lidocaine 1 percent, SC.  Ventricles: No left ventriculogram was performed. EF was not assessed.  Coronary vessels: The coronary circulation is right dominant.  LM:      LM: Normal.  LAD:      Mid LAD: There was a 100 % stenosis.  Distal LAD: There was a 70 % stenosis distal to the graft anastomosis.  CX:      OM1: There was a 80 % stenosis.  RCA:      Mid RCA: There was a 40 % stenosis.  GRAFTS:      Graft to the mid LAD: The graft was a LIMA. Graft angiography  showed no evidence of disease.  Graft to the 1st obtuse marginal: The graft was a saphenous vein graft from  the aorta. It was occluded.  Graft to the distal RCA: The graft was a saphenous vein graft from the  aorta. It was occluded.  Complications: There were no complications.  Recommendations:  Due to renal insufficiency and diabetes, limited diagnostic study performed  with 35cc contrast.  Native coronary disease is amenable to PCI, however, carries the risk of  contrast induced nephropathy because of the additional contrast needed.  Prepared and signed by  Axel Lincoln M.D.  Signed 2010 13:15:32  Hemodynamic tables  Pressures:  Baseline/ Room Air  Pressures:  - HR: 57  Pressures:  - Rhythm:  Pressures:  -- Aortic Pressure (S/D/M): 126/51/78  Pressures:  -- Left Ventricle (s/edp): 120/10/--  Pressures:  Post Angio  Pressures:  - HR: 58  Pressures:  - Rhythm:  Pressures:  -- Aortic Pressure (S/D/M): 120/50/75  Pressures:  -- Left Ventricle (s/edp): 121/10/--  Outputs:  Baseline/ Room Air  Outputs:  -- CALCULATIONS[age]  Outputs:  -- CALCULATIONS: Body Surface Area: 1.99  Outputs:  -- CALCULATIONS: Height in cm: 155.00  Outputs:  -- CALCULATIONS: Sex: Female  Outputs:  -- CALCULATIONS: Weight in k.10    < end of copied text >      Imaging:  < from: Xray Chest 1 View AP/PA (10.03.17 @ 13:40) >  EXAM:  CHEST 1 VIEW                            PROCEDURE DATE:  10/03/2017          INTERPRETATION:  Hypoglycemia.    AP chest. Prior 2013.    Low lung volumes. Left cardiac pacer reidentified in situ. Status post   median sternotomy. No change heart mediastinum. There is mild vascular   congestion bilateral interstitial edema trace right pleural effusion   consistent with fluid overload or mild/early CHF. Calcific bursitis left   shoulder.    Impression: As above          < end of copied text >    Interpretation of Telemetry:  Not on Tele - ORDERED     Physical Exam:  Appearance: [x ] Normal  [ ] abnormal [x ] NAD with mild dyspnea  Eyes: [x ] PERRL [ ] EOMI  HENT: [x ] Normal [x ] pt with hematoma to Left side and tip of tongue (states bit her tongue) with no airway impingement [ x]NC/AT  Cardiovascular: [ x] S1 [x ] S2 [ ] RRR [x ] 3-4/6 SM [x ]edema Left peripheral LE 2-3 +> right  1+[ ] JVP  Procedural Access Site: [ ]  hematoma [ ] tender to palpation [ ] 2+ pulse [ ] bruit [ ] Ecchymosis  Respiratory: [ ] B/L basal rales L>R  Gastrointestinal: [x ] Soft [ ] tenderness[ ] distension [x ] BS  Musculoskeletal: [ ] clubbing [ ] joint deformity   Neurologic: [x ] Non-focal  Lymphatic: [ ] lymphadenopathy  Psychiatry: [x ] AAOx3  [ ] confused [ ] disoriented [x ] Mood & affect appropriate  Skin: [ ]  rashes [ ] ecchymoses [ ] cyanosis

## 2017-10-04 NOTE — DISCHARGE NOTE ADULT - OTHER SIGNIFICANT FINDINGS
on 10/6/2017 on exam    vital signs stable ,   lungs clear    heart s1 s2 regular    abd soft , nt  + bs  ext no edema , rt hand slight swelling    neuro axo x 3 non focal weak

## 2017-10-04 NOTE — PHYSICAL THERAPY INITIAL EVALUATION ADULT - GAIT TRAINING, PT EVAL
2 to 3 Sessions Ambulate 150ft with AD Independent 3 to 4 Sessions Ambulate >150ft with rolling walker and [place]

## 2017-10-04 NOTE — DISCHARGE NOTE ADULT - CARE PLAN
Principal Discharge DX:	Diabetic hypoglycemia  Goal:	monitor  Instructions for follow-up, activity and diet:	regular  Secondary Diagnosis:	Artificial cardiac pacemaker  Goal:	ppm was checked  Secondary Diagnosis:	Coronary artery disease involving transplanted heart, angina presence unspecified, unspecified vessel or lesion type  Goal:	asa  Secondary Diagnosis:	Essential hypertension  Goal:	metoprolol  Secondary Diagnosis:	High cholesterol  Goal:	lipitor  Secondary Diagnosis:	Hypothyroidism, unspecified type  Goal:	synthroid  Secondary Diagnosis:	Pacemaker  Goal:	had pace maker checked

## 2017-10-04 NOTE — PROGRESS NOTE ADULT - ASSESSMENT
85 F w hx of CAD s/p CABG, HTN, DM, HLD, mild ICM, severe MR, hypothyroid, PPM presents for near syncope/AMS.    - AMS likely from hypoglycemia.  Pt awake and alert feeling well with some dyspnea noted at rest and admits to self administering her insulin 25 units prior to breakfast without checking her blood sugar and has not been eating much 2/2 to not feeling hungry.  Pt states she does not have supplies for her glucometer.  Pt administers with pen and does it prior to bedtime as well.  Pt with AMS and hypoglycemia x 2 episodes this week.  Pt may need to be reeducated and have Diabetes educator and care coordination to see pt to evaluate needs to avoid further hypoglycemic events.    - Consider Diabetes and Nutrition consult and educator.  Care coordination to assess needs. Consider Diabetes Consult and check AIC to evaluate medication regime 2/2 Hypoglycemia and poor self-management of her diabetes recently.    - Hold IVF 2/2 pt appears to be overloaded after receiving hydration overnight for her TITA on CKD with creatinine improving from 3 - 2.4 with baseline in office 1 year ago 2.23.  _Pt with declining LVEF from 52% (2015) to 35% on TTE 4/2016 and DD with Severe MR and severe pHTN.  Pt with pitting edema 2-3+ Left LE > Rt.  - received Lasix 40mg IVP yesterday but has increased fluid volume overload and is mildly symptomatic. Give Lasix 40mg IVP x 1 now strict I & O, Daily weights and reevaluate.  - Doppler to r/o DVT 2/2 unilateral LLE edema  - Cont current DM meds.  - Abx for UTI  - Will need repeat echo  - PT does not want to pursue a mitral clip  - Possibly with new AF. Will check 12 lead ECG.  [employer] Rep called for interrogation.  If does have AF, will likely need full dose ac  - observe on remote tele  - Monitor and replete electrolytes. Keep K>4.0 and Mg>2.0.  - Further cardiac workup will depend on clinical course.  - All other workup per primary team. Will followup.     Alysa Torres NP-C  Cardiology 85 F w hx of CAD s/p CABG, HTN, DM, HLD, mild ICM, severe MR, hypothyroid, PPM presents for near syncope/AMS.    - AMS likely from hypoglycemia.  Pt awake and alert feeling well with some dyspnea noted at rest and admits to self administering her insulin 25 units prior to breakfast without checking her blood sugar and has not been eating much 2/2 to not feeling hungry.  Pt states she does not have supplies for her glucometer.  Pt administers with pen and does it prior to bedtime as well.  Pt with AMS and hypoglycemia x 2 episodes this week.  Pt may need to be reeducated and have Diabetes educator and care coordination to see pt to evaluate needs to avoid further hypoglycemic events.    - Consider Diabetes and Nutrition consult and educator.  Care coordination to assess needs. Consider Diabetes Consult and check AIC to evaluate medication regime 2/2 Hypoglycemia and poor self-management of her diabetes recently.    - Hold IVF 2/2 pt appears to be overloaded after receiving hydration overnight for her TITA on CKD with creatinine improving from 3 - 2.4 with baseline in office 1 year ago 2.23.  _Pt with declining LVEF from 52% (2015) to 35% on TTE 4/2016 and DD with Severe MR and severe pHTN.  Pt with pitting edema 2-3+ Left LE > Rt.  - received Lasix 40mg IVP yesterday but has increased fluid volume overload and is mildly symptomatic. Give Lasix 40mg IVP x 1 now strict I & O, Daily weights and reevaluate.  - Doppler to r/o DVT 2/2 unilateral LLE edema  - Cont current DM meds.  - Abx for UTI  - Will need repeat echo  - PT does not want to pursue a mitral clip  - Possibly with new AF. Will check 12 lead ECG.  [employer] Rep called for interrogation.  If does have AF, will likely need full dose ac  - IF BP tolerates would consider starting Toprol XL 25 2/2 ICM awaiting Echo to evaluate EF.    - observe on remote tele  - Monitor and replete electrolytes. Keep K>4.0 and Mg>2.0.  - Further cardiac workup will depend on clinical course.  - All other workup per primary team. Will followup.     SANJIV Santoyo  Cardiology 85 F w hx of CAD s/p CABG, HTN, DM, HLD, mild ICM, severe MR, hypothyroid, PPM presents for near syncope/AMS.    - AMS likely from hypoglycemia.  Pt awake and alert feeling well with some dyspnea noted at rest and admits to self administering her insulin 25 units prior to breakfast without checking her blood sugar and has not been eating much 2/2 to not feeling hungry.  Pt states she does not have supplies for her glucometer.  Pt administers with pen and does it prior to bedtime as well.  Pt with AMS and hypoglycemia x 2 episodes this week.  Pt may need to be reeducated and have Diabetes educator and care coordination to see pt to evaluate needs to avoid further hypoglycemic events.    - Consider Diabetes and Nutrition consult and educator.  Care coordination to assess needs. Consider Diabetes Consult and check AIC to evaluate medication regime 2/2 Hypoglycemia and poor self-management of her diabetes recently.    - Hold IVF 2/2 pt appears to be overloaded after receiving hydration overnight for her TITA on CKD with creatinine improving from 3 - 2.4 with baseline in office 1 year ago 2.23.  _Pt with declining LVEF from 52% (2015) to 35% on TTE 4/2016 and DD with Severe MR and severe pHTN.  Pt with pitting edema 2-3+ Left LE > Rt.  - received Lasix 40mg IVP yesterday but has increased fluid volume overload and is mildly symptomatic. Give Lasix 40mg IVP x 1 now strict I & O, Daily weights and reevaluate.  - will check orthostatics  - Doppler to r/o DVT 2/2 unilateral LLE edema  - Cont current DM meds.  - Abx for UTI  - Will need repeat echo  - PT does not want to pursue a mitral clip  - Possibly with new AF. Will check 12 lead ECG.  [employer] Rep called for interrogation.  If does have AF, will likely need full dose ac  - IF BP tolerates would consider starting Toprol XL 25 2/2 ICM awaiting Echo to evaluate EF.    - observe on remote tele  - Monitor and replete electrolytes. Keep K>4.0 and Mg>2.0.  - Further cardiac workup will depend on clinical course.  - All other workup per primary team. Will followup.     SANJIV Santoyo  Cardiology 85 F w hx of CAD s/p CABG, HTN, DM, HLD, mild ICM, severe MR, hypothyroid, PPM presents for near syncope/AMS.    - AMS likely from hypoglycemia.  Pt awake and alert feeling well with some dyspnea noted at rest and admits to self administering her insulin 25 units prior to breakfast without checking her blood sugar and has not been eating much 2/2 to not feeling hungry.  Pt states she does not have supplies for her glucometer.  Pt administers with pen and does it prior to bedtime as well.  Pt with AMS and hypoglycemia x 2 episodes this week.  Pt may need to be reeducated and have Diabetes educator and care coordination to see pt to evaluate needs to avoid further hypoglycemic events.    - Consider Diabetes and Nutrition consult and educator.  Care coordination to assess needs. Consider Diabetes Consult and check AIC to evaluate medication regime 2/2 Hypoglycemia and poor self-management of her diabetes recently.    - Hold IVF 2/2 pt appears to be overloaded after receiving hydration overnight for her TITA on CKD with creatinine improving from 3 - 2.4 with baseline in office 1 year ago 2.23.  _Pt with declining LVEF from 52% (2015) to 35% on TTE 4/2016 and DD with Severe MR and severe pHTN.  Pt with pitting edema 2-3+ Left LE > Rt.  - received Lasix 40mg IVP yesterday but has increased fluid volume overload and is mildly symptomatic. Give Lasix 40mg IVP x 1 now strict I & O, Daily weights and reevaluate.  - Anemia with hgb 10 -> 8.8 most likely dilutional 2/2 fluid overload and receipt of IVF will check orthostatics and repeat in am other  than stable hematoma on tongue no other evidence of bleeding.   - Doppler to r/o DVT 2/2 unilateral LLE edema  - Cont current DM meds.  - Abx for UTI  - Will need repeat echo  - PT does not want to pursue a mitral clip  - Possibly with new AF. Will check 12 lead ECG.  [employer] Rep called for interrogation completed no AF noted or events noted.   - IF BP tolerates would consider starting Toprol XL 25 2/2 ICM awaiting Echo to evaluate EF.    - observe on remote tele  - c/w VTE prophylaxis  - Monitor and replete electrolytes. Keep K>4.0 and Mg>2.0.  - Further cardiac workup will depend on clinical course.  - All other workup per primary team. Will followup.     Alysa Torres, NP-C  Cardiology

## 2017-10-04 NOTE — CONSULT NOTE ADULT - SUBJECTIVE AND OBJECTIVE BOX
CHIEF COMPLAINT: Patient is a 85y old  Female who presents with a chief complaint of near syncope    HPI: 85 F w hx of CAD s/p CABG, HTN, DM, HLD, mild ICM, severe MR, hypothyroid, PPM presents for near syncope/AMS. Pt recently had a bout of hypoglycemia which recovered when given glucose. Again today was noted to be altered. Found to be severely hypoglycemic. Recovered with administration of glucose. Unclear if taking insulin appropriately. She has been lost to followup to our office for >1 year She states that she can no longer get out.   Denies any chest pain, PND, orthopnea, near syncope, syncope,  stroke like symptoms. She still has dyspnea on exertion. This seems to have been chronic and stable. She complains of increased mild lower etremity edema.       EKG: ?AF     REVIEW OF SYSTEMS:   All other review of systems are negative unless indicated above    PAST MEDICAL & SURGICAL HISTORY:  High cholesterol  Diabetes  HTN (hypertension)  Hypothyroid  Pacemaker  Artificial cardiac pacemaker      SOCIAL HISTORY:  No tobacco, ethanol, or drug abuse.    FAMILY HISTORY:  No pertinent family history in first degree relatives    No family history of acute MI or sudden cardiac death.    MEDICATIONS  (STANDING):  cefTRIAXone   IVPB 1 Gram(s) IV Intermittent every 24 hours  dextrose 5% + sodium chloride 0.9%. 1000 milliLiter(s) (50 mL/Hr) IV Continuous <Continuous>  dextrose 5% + sodium chloride 0.9%. 1000 milliLiter(s) (200 mL/Hr) IV Continuous <Continuous>  dextrose 5%. 1000 milliLiter(s) (50 mL/Hr) IV Continuous <Continuous>  dextrose 50% Injectable 12.5 Gram(s) IV Push once  dextrose 50% Injectable 25 Gram(s) IV Push once  dextrose 50% Injectable 25 Gram(s) IV Push once  enoxaparin Injectable 30 milliGRAM(s) SubCutaneous daily  influenza   Vaccine 0.5 milliLiter(s) IntraMuscular once  insulin lispro (HumaLOG) corrective regimen sliding scale   SubCutaneous three times a day before meals  lactobacillus acidophilus 1 Tablet(s) Oral two times a day with meals  levothyroxine 88 MICROGram(s) Oral daily    MEDICATIONS  (PRN):  dextrose Gel 1 Dose(s) Oral once PRN Blood Glucose LESS THAN 70 milliGRAM(s)/deciliter  glucagon  Injectable 1 milliGRAM(s) IntraMuscular once PRN Glucose LESS THAN 70 milligrams/deciliter      Allergies    No Known Allergies    Intolerances            VITAL SIGNS:   Vital Signs Last 24 Hrs  T(C): 36.9 (03 Oct 2017 23:46), Max: 37.1 (03 Oct 2017 13:20)  T(F): 98.4 (03 Oct 2017 23:46), Max: 98.7 (03 Oct 2017 13:20)  HR: 83 (03 Oct 2017 23:46) (76 - 87)  BP: 129/73 (03 Oct 2017 23:46) (110/71 - 129/73)  BP(mean): --  RR: 18 (03 Oct 2017 23:46) (15 - 18)  SpO2: 94% (03 Oct 2017 23:46) (94% - 98%)    I&O's Summary      On Exam:     Constitutional: NAD, awake and alert, well-developed  HEENT: Moist Mucous Membranes, Anicteric  Pulmonary: Decreased breath sounds b/l. mild crackles at bases  Cardiovascular: Regular, S1 and S2,distant 1/6 SM  Gastrointestinal: Bowel Sounds present, soft, nontender.   Lymph: trace peripheral edema. No lymphadenopathy.  Skin: No visible rashes or ulcers.  Psych:  Mood & affect appropriate    LABS: All Labs Reviewed:                        10.1   8.9   )-----------( 260      ( 03 Oct 2017 13:43 )             32.6     03 Oct 2017 13:43    143    |  111    |  33     ----------------------------<  178    5.1     |  26     |  3.00     Ca    8.6        03 Oct 2017 13:43    TPro  6.8    /  Alb  3.2    /  TBili  0.5    /  DBili  x      /  AST  23     /  ALT  13     /  AlkPhos  26     03 Oct 2017 13:43          Blood Culture:     10-03 @ 13:43  TSH: 3.45      RADIOLOGY:  < from: Xray Chest 1 View AP/PA (10.03.17 @ 13:40) >    EXAM:  CHEST 1 VIEW                            PROCEDURE DATE:  10/03/2017          INTERPRETATION:  Hypoglycemia.    AP chest. Prior 12/20/2013.    Low lung volumes. Left cardiac pacer reidentified in situ. Status post   median sternotomy. No change heart mediastinum. There is mild vascular   congestion bilateral interstitial edema trace right pleural effusion   consistent with fluid overload or mild/early CHF. Calcific bursitis left   shoulder.    Impression: As above                NIKOLAY CANNON M.D., ATTENDING RADIOLOGIST  This document has been electronically signed. Oct  3 2017  1:42PM                < end of copied text >
Patient is a 85y Female whom presented to the hospital with ckd and el increase bun and cr     PAST MEDICAL & SURGICAL HISTORY:  High cholesterol  Diabetes  HTN (hypertension)  Hypothyroid  Pacemaker  Artificial cardiac pacemaker      MEDICATIONS  (STANDING):  aspirin  chewable 81 milliGRAM(s) Oral daily  atorvastatin 10 milliGRAM(s) Oral at bedtime  cefTRIAXone   IVPB 1 Gram(s) IV Intermittent every 24 hours  dextrose 50% Injectable 12.5 Gram(s) IV Push once  dextrose 50% Injectable 25 Gram(s) IV Push once  dextrose 50% Injectable 25 Gram(s) IV Push once  docusate sodium 100 milliGRAM(s) Oral two times a day  enoxaparin Injectable 30 milliGRAM(s) SubCutaneous daily  folic acid 1 milliGRAM(s) Oral daily  influenza   Vaccine 0.5 milliLiter(s) IntraMuscular once  insulin lispro (HumaLOG) corrective regimen sliding scale   SubCutaneous three times a day before meals  insulin lispro (HumaLOG) corrective regimen sliding scale   SubCutaneous at bedtime  lactobacillus acidophilus 1 Tablet(s) Oral two times a day with meals  levothyroxine 88 MICROGram(s) Oral daily  losartan 50 milliGRAM(s) Oral daily  sodium chloride 0.9%. 1000 milliLiter(s) (50 mL/Hr) IV Continuous <Continuous>      Allergies    No Known Allergies    Intolerances        SOCIAL HISTORY:  Denies ETOh,Smoking,     FAMILY HISTORY:  No pertinent family history in first degree relatives      REVIEW OF SYSTEMS:    CONSTITUTIONAL: No weakness, fevers or chills  NECK: No pain or stiffness  RESPIRATORY: No cough, wheezing, No shortness of breath  CARDIOVASCULAR: No chest pain or palpitations  GASTROINTESTINAL: No abdominal or epigastric pain. No nausea, vomiting,    No diarrhea or constipation.   GENITOURINARY: No dysuria, frequency or hematuria  NEUROLOGICAL: No numbness or weakness  SKIN: No itching, burning, rashes, or lesions     VITAL:  T(C): , Max: 37 (10-03-17 @ 21:42)  T(F): , Max: 98.6 (10-03-17 @ 21:42)  HR: 88 (10-04-17 @ 14:59)  BP: 110/69 (10-04-17 @ 14:59)  BP(mean): --  RR: 15 (10-04-17 @ 14:59)  SpO2: 98% (10-04-17 @ 14:59)  Wt(kg): --    I and O's:        PHYSICAL EXAM:    Constitutional: NAD  Neck: No LAD, No JVD  Respiratory: CTAB  Cardiovascular: S1 and S2  Gastrointestinal: BS+, soft, NT/ND  Extremities: No peripheral edema  Neurological:  no focal deficits    LABS:                        8.8    8.6   )-----------( 191      ( 04 Oct 2017 07:47 )             29.5     10-04    146<H>  |  113<H>  |  27<H>  ----------------------------<  146<H>  4.7   |  24  |  2.40<H>    Ca    7.9<L>      04 Oct 2017 07:47    TPro  6.8  /  Alb  3.2<L>  /  TBili  0.5  /  DBili  x   /  AST  23  /  ALT  13  /  AlkPhos  26<L>  10-03      Urine Studies:  Urinalysis Basic - ( 03 Oct 2017 13:43 )    Color: Yellow / Appearance: Clear / S.020 / pH: x  Gluc: x / Ketone: Negative  / Bili: Negative / Urobili: Negative   Blood: x / Protein: 25 mg/dL / Nitrite: Negative   Leuk Esterase: Moderate / RBC: 3-5 /HPF / WBC >50   Sq Epi: x / Non Sq Epi: Moderate / Bacteria: TNTC            RADIOLOGY & ADDITIONAL STUDIES:

## 2017-10-04 NOTE — CONSULT NOTE ADULT - PROBLEM SELECTOR RECOMMENDATION 4
obtain ECHO to evaluate LVEF,cardiology consult,continue current managmnet,O2 supply,anticoagulation plan as per cardiology consult

## 2017-10-04 NOTE — PHYSICAL THERAPY INITIAL EVALUATION ADULT - PATIENT/FAMILY/SIGNIFICANT OTHER GOALS STATEMENT, PT EVAL
Daughter wants pt to get services at home to assist pt at home and also learn how to better manage her diabetes

## 2017-10-04 NOTE — DISCHARGE NOTE ADULT - MEDICATION SUMMARY - MEDICATIONS TO STOP TAKING
I will STOP taking the medications listed below when I get home from the hospital:    Levemir FlexPen 100 units/mL subcutaneous solution  --  subcutaneous 28 units 2 x a day    hydrochlorothiazide 12.5 mg oral tablet  -- 1 tab(s) by mouth once a day

## 2017-10-04 NOTE — PHYSICAL THERAPY INITIAL EVALUATION ADULT - ORIENTATION, REHAB EVAL
oriented to person, place, time and situation [place]/oriented to person, place, time and situation

## 2017-10-04 NOTE — PHYSICAL THERAPY INITIAL EVALUATION ADULT - GENERAL OBSERVATIONS, REHAB EVAL
Patient received in bed /c NAD or c/o. Pt reports "I haven't been able to walk in days'. Pt is agreeable /c PT eval.

## 2017-10-04 NOTE — ADVANCED PRACTICE NURSE CONSULT - RECOMMEDATIONS
T2 DM with hypoglycemia  Monitor BG levels target goals 100-180 mg/dL  Case discussed with Dr. Najera  Change to low corrective scale AC and low HS (due to GFR <20)  recommend diet consult

## 2017-10-04 NOTE — DISCHARGE NOTE ADULT - MEDICATION SUMMARY - MEDICATIONS TO TAKE
I will START or STAY ON the medications listed below when I get home from the hospital:    acetaminophen 325 mg oral tablet  -- 2 tab(s) by mouth every 6 hours, As needed, headache  -- Indication: For Tylenol    aspirin 81 mg oral tablet, chewable  -- 1 tab(s) by mouth once a day  -- Indication: For Cad    losartan  -- 12.5 milligram(s) by mouth once a day  -- Indication: For HTN (hypertension)    enoxaparin  -- 30 milligram(s) subcutaneous once a day  -- Indication: For Dvt prophylaxis    atorvastatin 10 mg oral tablet  -- 1 tab(s) by mouth once a day (at bedtime)  -- Indication: For lipids    fenofibrate 145 mg oral tablet  -- 1 tab(s) by mouth once a day  -- Indication: For mvi    metoprolol succinate 25 mg oral tablet, extended release  -- 1 tab(s) by mouth once a day  -- Indication: For HTN (hypertension)    ranitidine 150 mg oral capsule  --  by mouth 1 tablet daily  -- Indication: For gerd    ferrous sulfate 325 mg (65 mg elemental iron) oral tablet  -- 1 tab(s) by mouth once a day  -- Indication: For Anemia    docusate sodium 100 mg oral capsule  -- 1 cap(s) by mouth 2 times a day  -- Indication: For Constipation    lactobacillus acidophilus oral capsule  -- 1 tab(s) by mouth 2 times a day  -- Indication: For Probiotics    levothyroxine 88 mcg (0.088 mg) oral tablet  -- 1 tab(s) by mouth once a day  -- Indication: For Hypothyroidism due to medication    multivitamin  -- 1 tab(s) by mouth once a day  -- Indication: For vitamin    ascorbic acid 500 mg oral tablet  -- 1 tab(s) by mouth once a day  -- Indication: For vit c     folic acid 1 mg oral tablet  -- 1 tab(s) by mouth once a day  -- Indication: For vit c I will START or STAY ON the medications listed below when I get home from the hospital:    acetaminophen 325 mg oral tablet  -- 2 tab(s) by mouth every 6 hours, As needed, headache  -- Indication: For Tylenol    aspirin 81 mg oral tablet, chewable  -- 1 tab(s) by mouth once a day  -- Indication: For Cad    losartan  -- 12.5 milligram(s) by mouth once a day  -- Indication: For HTN (hypertension)    calcium carbonate 500 mg (200 mg elemental calcium) oral tablet, chewable  -- 1 tab(s) by mouth 3 times a day  -- Indication: For Ckd    enoxaparin  -- 30 milligram(s) subcutaneous once a day  -- Indication: For Dvt prophylaxis    atorvastatin 10 mg oral tablet  -- 1 tab(s) by mouth once a day (at bedtime)  -- Indication: For lipids    fenofibrate 145 mg oral tablet  -- 1 tab(s) by mouth once a day  -- Indication: For mvi    metoprolol succinate 25 mg oral tablet, extended release  -- 1 tab(s) by mouth once a day  -- Indication: For HTN (hypertension)    cefuroxime 500 mg oral tablet  -- 1 tab(s) by mouth every 12 hours  -- Indication: For Uti    ranitidine 150 mg oral capsule  --  by mouth 1 tablet daily  -- Indication: For gerd    ferrous sulfate 325 mg (65 mg elemental iron) oral tablet  -- 1 tab(s) by mouth once a day  -- Indication: For Anemia    docusate sodium 100 mg oral capsule  -- 1 cap(s) by mouth 2 times a day  -- Indication: For Constipation    lactobacillus acidophilus oral capsule  -- 1 tab(s) by mouth 2 times a day  -- Indication: For Probiotics    levothyroxine 88 mcg (0.088 mg) oral tablet  -- 1 tab(s) by mouth once a day  -- Indication: For Hypothyroidism due to medication    multivitamin  -- 1 tab(s) by mouth once a day  -- Indication: For vitamin    ascorbic acid 500 mg oral tablet  -- 1 tab(s) by mouth once a day  -- Indication: For vit c     folic acid 1 mg oral tablet  -- 1 tab(s) by mouth once a day  -- Indication: For vit c I will START or STAY ON the medications listed below when I get home from the hospital:    acetaminophen 325 mg oral tablet  -- 2 tab(s) by mouth every 6 hours, As needed, headache  -- Indication: For Tylenol    aspirin 81 mg oral tablet, chewable  -- 1 tab(s) by mouth once a day  -- Indication: For Cad    losartan  -- 12.5 milligram(s) by mouth once a day  -- Indication: For HTN (hypertension)    calcium carbonate 500 mg (200 mg elemental calcium) oral tablet, chewable  -- 1 tab(s) by mouth 3 times a day  -- Indication: For Ckd    enoxaparin  -- 30 milligram(s) subcutaneous once a day  -- Indication: For Dvt prophylaxis    atorvastatin 10 mg oral tablet  -- 1 tab(s) by mouth once a day (at bedtime)  -- Indication: For lipids    fenofibrate 145 mg oral tablet  -- 1 tab(s) by mouth once a day  -- Indication: For mvi    metoprolol succinate 25 mg oral tablet, extended release  -- 1 tab(s) by mouth once a day  -- Indication: For HTN (hypertension)    cefuroxime 500 mg oral tablet  -- 0.5 tab(s) by mouth every 12 hours  -- Indication: For Uti    furosemide 40 mg oral tablet  -- 1 tab(s) by mouth once a day  -- Indication: For Edema    ranitidine 150 mg oral capsule  --  by mouth 1 tablet daily  -- Indication: For gerd    ferrous sulfate 325 mg (65 mg elemental iron) oral tablet  -- 1 tab(s) by mouth once a day  -- Indication: For Anemia    docusate sodium 100 mg oral capsule  -- 1 cap(s) by mouth 2 times a day  -- Indication: For Constipation    lactobacillus acidophilus oral capsule  -- 1 tab(s) by mouth 2 times a day  -- Indication: For Probiotics    levothyroxine 88 mcg (0.088 mg) oral tablet  -- 1 tab(s) by mouth once a day  -- Indication: For Hypothyroidism due to medication    multivitamin  -- 1 tab(s) by mouth once a day  -- Indication: For vitamin    ascorbic acid 500 mg oral tablet  -- 1 tab(s) by mouth once a day  -- Indication: For vit c     folic acid 1 mg oral tablet  -- 1 tab(s) by mouth once a day  -- Indication: For vit c I will START or STAY ON the medications listed below when I get home from the hospital:    acetaminophen 325 mg oral tablet  -- 2 tab(s) by mouth every 6 hours, As needed, headache  -- Indication: For Tylenol    aspirin 81 mg oral tablet, chewable  -- 1 tab(s) by mouth once a day  -- Indication: For Cad    losartan  -- 12.5 milligram(s) by mouth once a day  -- Indication: For HTN (hypertension)    calcium carbonate 500 mg (200 mg elemental calcium) oral tablet, chewable  -- 1 tab(s) by mouth 3 times a day  -- Indication: For Ckd    enoxaparin  -- 30 milligram(s) subcutaneous once a day  -- Indication: For Dvt prophylaxis    insulin glargine  -- 5 unit(s) subcutaneous once a day  -- Indication: For Dm    insulin lispro 100 units/mL subcutaneous solution  --  subcutaneous 3 times a day (before meals); 1 Unit(s) if Glucose 151 - 200  2 Unit(s) if Glucose 201 - 250  3 Unit(s) if Glucose 251 - 300  4 Unit(s) if Glucose 301 - 350  5 Unit(s) if Glucose 351 - 400  6 Unit(s) if Glucose Greater Than 400  -- Indication: For Dm    atorvastatin 10 mg oral tablet  -- 1 tab(s) by mouth once a day (at bedtime)  -- Indication: For lipids    fenofibrate 145 mg oral tablet  -- 1 tab(s) by mouth once a day  -- Indication: For mvi    metoprolol succinate 25 mg oral tablet, extended release  -- 1 tab(s) by mouth once a day  -- Indication: For HTN (hypertension)    furosemide 40 mg oral tablet  -- 1 tab(s) by mouth once a day  -- Indication: For Edema    ranitidine 150 mg oral capsule  --  by mouth 1 tablet daily  -- Indication: For gerd    ferrous sulfate 325 mg (65 mg elemental iron) oral tablet  -- 1 tab(s) by mouth once a day  -- Indication: For Anemia    docusate sodium 100 mg oral capsule  -- 1 cap(s) by mouth 2 times a day  -- Indication: For Constipation    lactobacillus acidophilus oral capsule  -- 1 tab(s) by mouth 2 times a day  -- Indication: For Probiotics    pantoprazole 40 mg oral delayed release tablet  -- 1 tab(s) by mouth once a day (before a meal)  -- Indication: For gastritis    levothyroxine 88 mcg (0.088 mg) oral tablet  -- 1 tab(s) by mouth once a day  -- Indication: For Hypothyroidism due to medication    multivitamin  -- 1 tab(s) by mouth once a day  -- Indication: For vitamin    ascorbic acid 500 mg oral tablet  -- 1 tab(s) by mouth once a day  -- Indication: For vit c     folic acid 1 mg oral tablet  -- 1 tab(s) by mouth once a day  -- Indication: For vit c

## 2017-10-04 NOTE — DIETITIAN INITIAL EVALUATION ADULT. - NS AS NUTRI INTERV MEALS SNACK
suggest change to consistent cho 60gm protein low Na diet/Protein - modified diet/Mineral - modified diet/Carbohydrate - modified diet

## 2017-10-04 NOTE — PHYSICAL THERAPY INITIAL EVALUATION ADULT - IMPAIRMENTS CONTRIBUTING TO GAIT DEVIATIONS, PT EVAL
impaired balance/decreased strength narrow base of support/impaired balance/decreased strength/decreased sensation/impaired postural control

## 2017-10-04 NOTE — CONSULT NOTE ADULT - ASSESSMENT
84 yo f ,  , lives with dtr , in her own apartment , former smoker , hx of diabetes mellitus type 2 , osteoarthritis , hypertension , osteoporosis , obesity , cabg , ppm, and severe MR ckd and el

## 2017-10-04 NOTE — DISCHARGE NOTE ADULT - CARE PROVIDER_API CALL
Nadeem Najera), Internal Medicine  [address] [place] [address]  Phone: [phone]  Fax: [phone]

## 2017-10-04 NOTE — PHYSICAL THERAPY INITIAL EVALUATION ADULT - TRANSFER TRAINING, PT EVAL
2 to 3 Sessions Sit to Stand Independent 2 to 3 Sessions Sit<->Stand /c rolling walker /c [place]

## 2017-10-04 NOTE — ADVANCED PRACTICE NURSE CONSULT - ASSESSMENT
Vital Signs Last 24 Hrs  T(C): 36.9 (04 Oct 2017 04:28), Max: 37.1 (03 Oct 2017 13:20)  T(F): 98.4 (04 Oct 2017 04:28), Max: 98.7 (03 Oct 2017 13:20)  HR: 107 (04 Oct 2017 11:46) (76 - 107)  BP: 110/61 (04 Oct 2017 04:28) (110/61 - 129/73)  BP(mean): --  RR: 18 (04 Oct 2017 04:28) (15 - 18)  SpO2: 95% (04 Oct 2017 11:46) (94% - 98%)     eGFR if Non African American: 18 mL/min/1.73M2 (10-04-17 @ 07:47)  eGFR if : 21 mL/min/1.73M2 (10-04-17 @ 07:47)  eGFR if Non African American: 14 mL/min/1.73M2 (10-03-17 @ 13:43)  eGFR if : 16 mL/min/1.73M2 (10-03-17 @ 13:43)      CAPILLARY BLOOD GLUCOSE  134 (04 Oct 2017 12:07)  155 (04 Oct 2017 09:30)  106 (04 Oct 2017 00:04)  165 (03 Oct 2017 15:04)          DIET: CC

## 2017-10-04 NOTE — PHYSICAL THERAPY INITIAL EVALUATION ADULT - PHYSICAL ASSIST/NONPHYSICAL ASSIST: STAND/SIT, REHAB EVAL
supervision verbal cues/nonverbal cues (demo/gestures)/set-up required/tactile cues for hand placement

## 2017-10-04 NOTE — PHYSICAL THERAPY INITIAL EVALUATION ADULT - ADDITIONAL COMMENTS
Patient lives in private home with 13 steps to the basement. Patient lives in private home with 13 steps to the basement and right rail and 3-4 [place]AR[place] /c no rails. Pt daughter lives upstairs and works til about 2 pm. Pt was independent /c functional mobility and ADLs prior to admission. Pt uses a rolling walker to amb. Pt admits to not monitoring her blood sugars and skipping meals. Pt is sedentary. Pt has a handicapped shower /c grab bars, owns a single axis cane, wheelchair and rolling walker. Family is supportive. *other daughter wants home care services and education on how to help her mother manage diabetes better .

## 2017-10-04 NOTE — PROGRESS NOTE ADULT - SUBJECTIVE AND OBJECTIVE BOX
PCP  Subjective:   in bed awake , alert , denies pain , a bit scared     Objective:   T(F): 98.4 (10-04-17 @ 04:28), Max: 98.7 (10-03-17 @ 13:20)  HR: 79 (10-04-17 @ 04:28) (76 - 87)  BP: 110/61 (10-04-17 @ 04:28) (110/61 - 129/73)  RR: 18 (10-04-17 @ 04:28) (15 - 18)  SpO2: 94% (10-04-17 @ 04:28) (94% - 98%)  Wt(kg): --  Daily Height in cm: 162.56 (03 Oct 2017 12:47)    Daily Weight in k.1 (03 Oct 2017 23:46)    GENERAL:  wdwn f , awake , alert doing better  EYES: eomi  NECK: supple , no mass  CHEST/LUNG: clear  HEART: s1 s2 regular 2/6 feng   ABDOMEN: soft nt + bs  EXTREMITIES:  edema in LE bilateral  SKIN: warm   CNS: non focal [place] , responsive , and nervous , ? GARCÍA, poor balance    Allergies: Allergies    No Known Allergies    Intolerances        Medications:   acetaminophen   Tablet 650 milliGRAM(s) Oral every 6 hours PRN  aspirin  chewable 81 milliGRAM(s) Oral daily  atorvastatin 10 milliGRAM(s) Oral at bedtime  cefTRIAXone   IVPB 1 Gram(s) IV Intermittent every 24 hours  cyanocobalamin Injectable 1000 MICROGram(s) IntraMuscular once  dextrose 5%. 1000 milliLiter(s) IV Continuous <Continuous>  dextrose 50% Injectable 12.5 Gram(s) IV Push once  dextrose 50% Injectable 25 Gram(s) IV Push once  dextrose 50% Injectable 25 Gram(s) IV Push once  dextrose Gel 1 Dose(s) Oral once PRN  docusate sodium 100 milliGRAM(s) Oral two times a day  enoxaparin Injectable 30 milliGRAM(s) SubCutaneous daily  folic acid 1 milliGRAM(s) Oral daily  furosemide   Injectable 40 milliGRAM(s) IV Push once  glucagon  Injectable 1 milliGRAM(s) IntraMuscular once PRN  influenza   Vaccine 0.5 milliLiter(s) IntraMuscular once  insulin lispro (HumaLOG) corrective regimen sliding scale   SubCutaneous three times a day before meals  lactobacillus acidophilus 1 Tablet(s) Oral two times a day with meals  levothyroxine 88 MICROGram(s) Oral daily  losartan 50 milliGRAM(s) Oral daily      LABS:                        8.8    8.6   )-----------( 191      ( 04 Oct 2017 07:47 )             29.5     10-04    146<H>  |  113<H>  |  27<H>  ----------------------------<  146<H>  4.7   |  24  |  2.40<H>    Ca    7.9<L>      04 Oct 2017 07:47    TPro  6.8  /  Alb  3.2<L>  /  TBili  0.5  /  DBili  x   /  AST  23  /  ALT  13  /  AlkPhos  26<L>  10-03        Urinalysis Basic - ( 03 Oct 2017 13:43 )    Color: Yellow / Appearance: Clear / S.020 / pH: x  Gluc: x / Ketone: Negative  / Bili: Negative / Urobili: Negative   Blood: x / Protein: 25 mg/dL / Nitrite: Negative   Leuk Esterase: Moderate / RBC: 3-5 /HPF / WBC >50   Sq Epi: x / Non Sq Epi: Moderate / Bacteria: TNTC        CAPILLARY BLOOD GLUCOSE  155 (04 Oct 2017 09:30)  106 (04 Oct 2017 00:04)  165 (03 Oct 2017 15:04)                RECENT CULTURES:

## 2017-10-04 NOTE — CONSULT NOTE ADULT - PROBLEM SELECTOR RECOMMENDATION 2
ACUTE RENAL FAILURE: increase water intake    low salt diet ,   Serum creatinine is increase    , approximating GFR is decreased    ml/min.   [There is  progression]. [No uremic symptoms]   [No evidence of anemia].  Fluid status stable.  Will continue to avoid nephrotoxic drugs.  Patient remains asymptomatic.   Continue current therapy.  hold   diuretic.  hold   ACE inhibitor.  hold   ARB.

## 2017-10-04 NOTE — CONSULT NOTE ADULT - PROBLEM SELECTOR RECOMMENDATION 9
CHRONIC KIDNEY DISEASE, STAGE 3:   Serum creatinine is increase , approximating a GFR  is decreased ml/min.   [There is progression.] [No uremic symptoms.] [No evidence of  worsening  Anemia.]  Fluid status stable.  Will continue to avoid nephrotoxic drugs.   Patient remains asymptomatic.   Continue current therapy.

## 2017-10-04 NOTE — PHYSICAL THERAPY INITIAL EVALUATION ADULT - GAIT DEVIATIONS NOTED, PT EVAL
decreased justin decreased justin/decreased step length/decreased weight-shifting ability/slow, laborious slightly and steady /c rolling walker

## 2017-10-04 NOTE — PHYSICAL THERAPY INITIAL EVALUATION ADULT - PLANNED THERAPY INTERVENTIONS, PT EVAL
transfer training/gait training/strengthening/Stairs strengthening/transfer training/gait training/Stairs to be assessed when appropriate/balance training/bed mobility training

## 2017-10-04 NOTE — PHYSICAL THERAPY INITIAL EVALUATION ADULT - ANKLE STRATEGY ASSESSMENT, REHAB EVAL
Patient and daughter educated on benefits of exercises, diet, skin inspection, lifestyle modifications, over-all health and wellness to better manage diabetes /c fair to good understanding, needs review

## 2017-10-04 NOTE — PHYSICAL THERAPY INITIAL EVALUATION ADULT - BALANCE DISTURBANCE, IDENTIFIED IMPAIRMENT CONTRIBUTE, REHAB EVAL
decreased strength decreased sensation/impaired postural control/impaired coordination/decreased strength

## 2017-10-04 NOTE — DIETITIAN INITIAL EVALUATION ADULT. - OTHER INFO
unable to interview patient who is out for test at this time. referred by CDE. patient per CNA with good PO no problems chewing swallowing. Aic ordered. levemir at home. will educated and interview patient as available. unable to interview patient who is out for test at this time. referred by CDE. patient per CNA with good PO no problems chewing swallowing. although per CDE note low PO 2 weeks PTA .  Aic ordered. levemir at home. will educated and interview patient as available.

## 2017-10-04 NOTE — DISCHARGE NOTE ADULT - SECONDARY DIAGNOSIS.
Artificial cardiac pacemaker Coronary artery disease involving transplanted heart, angina presence unspecified, unspecified vessel or lesion type Essential hypertension High cholesterol Hypothyroidism, unspecified type Pacemaker

## 2017-10-04 NOTE — PHYSICAL THERAPY INITIAL EVALUATION ADULT - IMPAIRMENTS FOUND, PT EVAL
gait, locomotion, and balance aerobic capacity/endurance/muscle strength/integumentary integrity/gait, locomotion, and balance

## 2017-10-04 NOTE — DISCHARGE NOTE ADULT - PATIENT PORTAL LINK FT
“You can access the [employer] Patient Portal, offered by [facility], by registering with the following website: [email or web address]”

## 2017-10-04 NOTE — DISCHARGE NOTE ADULT - HOSPITAL COURSE
· Assessment		  86 yo f ,  , lives with dtr , in her own apartment , former smoker , hx of diabetes mellitus type 2 , osteoarthritis , hypertension , osteoporosis , obesity , cabg , ppm, and severe MR   patient has had an episode of hypoglycemia last Sunday ems called to house and was given glucagon , then today confused , and with change in mental status , and with low fs glucometer test and brought to er , given d50 and recovered  , later in er alert and responsive but fuggy ,   she takes 25 units Lantus bid , has not been by md in months , has difficulty ambulating , no n/v , no cp , no sob , no head aches , no rash , no fever , no chills , no dysuria ,   in er also abnl gfr , and anemia ,   on 10/4/2017 patient stable ,   blood sugar better ,   TITA on ckd , nephro to follow up , hydrate , and ck renal sono ,   dm fs and coverage hold lantus for now ,  coronary artery disease and MR , cardio to eval resume bp meds and asa , get echo    dvt and gi prophylax , and get PT eval , consider rehab  ? uti on iv abx , f/u cultures ,   on 10/5/2017 feels better , dm noted , hg a1c low , will dc insulin , and fs , and uti on iv abx   ckd and tita on ckd nephro noted , , case rev with dtr , dvt gi prophylax , plan rehab          Problem/Plan - 1:  ·  Problem: Diabetic hypoglycemia.  Plan: fs coverage , iv d5.      Problem/Plan - 2:  ·  Problem: Urinary tract infection without hematuria, site unspecified.  Plan: rocephine follow up cultures.      Problem/Plan - 3:  ·  Problem: Type 2 diabetes mellitus with other circulatory complication, unspecified long term insulin use status.  Plan: fs coverage.      Problem/Plan - 4:  ·  Problem: High cholesterol.  Plan: atorvastatin.      Problem/Plan - 5:  ·  Problem: Essential hypertension.  Plan: bp meds Norvasc.      Problem/Plan - 6:  Problem: Hypothyroidism due to medication. Plan: synthroid.     Problem/Plan - 7:  ·  Problem: Pacemaker.  Plan: monitor.      Problem/Plan - 8:  ·  Problem: Stage 4 chronic kidney disease.  Plan: iv hydrate.      Problem/Plan - 9:  ·  Problem: Arthritis.  Plan: tylenol and patient.      Problem/Plan - 10:  Problem: Coronary artery disease involving transplanted heart, angina presence unspecified, unspecified vessel or lesion type. Plan; asa , and monitor.    Attending Attestation:   I was physically present for the key portions of the evaluation and management (E/M) service provided.  I agree with the above history, physical, and plan which I have reviewed and edited where appropriate.

## 2017-10-04 NOTE — ADVANCED PRACTICE NURSE CONSULT - REASON FOR CONSULT
85y    Female    Patient is a 85y old  Female who presents with a chief complaint of hypoglycemia , change in mental status (03 Oct 2017 23:55)  CDE evaluation: a1c pending. SW patient and daughter re: diabetes management. Levemir 25 units 2xday. States does not have a meter and doesn't know if having hypoglycemic events. States having decrease appetite past 2 weeks.       HPI:  84 yo f ,  , lives with dtr , in her own apartment , former smoker , hx of diabetes mellitus type 2 , osteoarthritis , hypertension , osteoporosis , obesity , cabg , ppm, and severe MR  patient has had an episode of hypoglycemia last Sunday ems called to house and was given glucagon and recovered ,  , then today confused , and with change in mental status ,no syncope , and with low fs glucometer test and brought to er , given d50 and  , later in er alert and responsive but fuggy , eating some cookies ,   she takes 25 units Lantus bid , has not been by md in months , has difficulty ambulating , no n/v , no cp , no sob , no head aches , no rash , no fever , no chills , no dysuria , (03 Oct 2017 23:55)      PAST MEDICAL & SURGICAL HISTORY:  High cholesterol  Diabetes  HTN (hypertension)  Hypothyroid  Pacemaker  Artificial cardiac pacemaker      MEDICATIONS  (STANDING):  aspirin  chewable 81 milliGRAM(s) Oral daily  atorvastatin 10 milliGRAM(s) Oral at bedtime  cefTRIAXone   IVPB 1 Gram(s) IV Intermittent every 24 hours  cyanocobalamin Injectable 1000 MICROGram(s) IntraMuscular once  dextrose 50% Injectable 12.5 Gram(s) IV Push once  dextrose 50% Injectable 25 Gram(s) IV Push once  dextrose 50% Injectable 25 Gram(s) IV Push once  docusate sodium 100 milliGRAM(s) Oral two times a day  enoxaparin Injectable 30 milliGRAM(s) SubCutaneous daily  folic acid 1 milliGRAM(s) Oral daily  furosemide   Injectable 40 milliGRAM(s) IV Push once  influenza   Vaccine 0.5 milliLiter(s) IntraMuscular once  insulin lispro (HumaLOG) corrective regimen sliding scale   SubCutaneous three times a day before meals  insulin lispro (HumaLOG) corrective regimen sliding scale   SubCutaneous at bedtime  lactobacillus acidophilus 1 Tablet(s) Oral two times a day with meals  levothyroxine 88 MICROGram(s) Oral daily  losartan 50 milliGRAM(s) Oral daily  sodium chloride 0.9%. 1000 milliLiter(s) (50 mL/Hr) IV Continuous <Continuous>

## 2017-10-04 NOTE — PROGRESS NOTE ADULT - ASSESSMENT
86 yo f ,  , lives with dtr , in her own apartment , former smoker , hx of diabetes mellitus type 2 , osteoarthritis , hypertension , osteoporosis , obesity , cabg , ppm, and severe MR   patient has had an episode of hypoglycemia last Sunday ems called to house and was given glucagon , then today confused , and with change in mental status , and with low fs glucometer test and brought to er , given d50 and recovered  , later in er alert and responsive but fuggy ,   she takes 25 units Lantus bid , has not been by md in months , has difficulty ambulating , no n/v , no cp , no sob , no head aches , no rash , no fever , no chills , no dysuria ,   in er also abnl gfr , and anemia ,   on 10/4/2017 patient stable ,   blood sugar better ,   TITA on ckd , nephro to follow up , hydrate , and ck renal sono ,   dm fs and coverage hold lantus for now ,  coronary artery disease and MR , cardio to eval resume bp meds and asa , get echo    dvt and gi prophylax , and get PT eval , consider rehab  ? uti on iv abx , f/u cultures ,

## 2017-10-05 LAB
-  AMIKACIN: SIGNIFICANT CHANGE UP
-  AMPICILLIN/SULBACTAM: SIGNIFICANT CHANGE UP
-  AMPICILLIN: SIGNIFICANT CHANGE UP
-  AZTREONAM: SIGNIFICANT CHANGE UP
-  CEFAZOLIN: SIGNIFICANT CHANGE UP
-  CEFEPIME: SIGNIFICANT CHANGE UP
-  CEFOXITIN: SIGNIFICANT CHANGE UP
-  CEFTAZIDIME: SIGNIFICANT CHANGE UP
-  CEFTRIAXONE: SIGNIFICANT CHANGE UP
-  CIPROFLOXACIN: SIGNIFICANT CHANGE UP
-  ERTAPENEM: SIGNIFICANT CHANGE UP
-  GENTAMICIN: SIGNIFICANT CHANGE UP
-  IMIPENEM: SIGNIFICANT CHANGE UP
-  LEVOFLOXACIN: SIGNIFICANT CHANGE UP
-  MEROPENEM: SIGNIFICANT CHANGE UP
-  NITROFURANTOIN: SIGNIFICANT CHANGE UP
-  PIPERACILLIN/TAZOBACTAM: SIGNIFICANT CHANGE UP
-  TOBRAMYCIN: SIGNIFICANT CHANGE UP
-  TRIMETHOPRIM/SULFAMETHOXAZOLE: SIGNIFICANT CHANGE UP
ANION GAP SERPL CALC-SCNC: 9 MMOL/L — SIGNIFICANT CHANGE UP (ref 5–17)
BASOPHILS # BLD AUTO: 0.1 K/UL — SIGNIFICANT CHANGE UP (ref 0–0.2)
BASOPHILS NFR BLD AUTO: 0.9 % — SIGNIFICANT CHANGE UP (ref 0–2)
BUN SERPL-MCNC: 31 MG/DL — HIGH (ref 7–23)
CALCIUM SERPL-MCNC: 8.3 MG/DL — LOW (ref 8.5–10.1)
CALCIUM SERPL-MCNC: 8.6 MG/DL — SIGNIFICANT CHANGE UP (ref 8.4–10.5)
CHLORIDE SERPL-SCNC: 108 MMOL/L — SIGNIFICANT CHANGE UP (ref 96–108)
CO2 SERPL-SCNC: 26 MMOL/L — SIGNIFICANT CHANGE UP (ref 22–31)
CREAT SERPL-MCNC: 2.7 MG/DL — HIGH (ref 0.5–1.3)
CULTURE RESULTS: SIGNIFICANT CHANGE UP
EOSINOPHIL # BLD AUTO: 0.2 K/UL — SIGNIFICANT CHANGE UP (ref 0–0.5)
EOSINOPHIL NFR BLD AUTO: 2.4 % — SIGNIFICANT CHANGE UP (ref 0–6)
EPO SERPL-MCNC: 26.3 MIU/ML — HIGH (ref 2.6–18.5)
GLUCOSE SERPL-MCNC: 157 MG/DL — HIGH (ref 70–99)
HBA1C BLD-MCNC: 4.4 % — SIGNIFICANT CHANGE UP (ref 4–5.6)
HCT VFR BLD CALC: 30.8 % — LOW (ref 34.5–45)
HGB BLD-MCNC: 9 G/DL — LOW (ref 11.5–15.5)
IRON SATN MFR SERPL: 15 % — SIGNIFICANT CHANGE UP (ref 14–50)
IRON SATN MFR SERPL: 53 UG/DL — SIGNIFICANT CHANGE UP (ref 30–160)
LYMPHOCYTES # BLD AUTO: 1.8 K/UL — SIGNIFICANT CHANGE UP (ref 1–3.3)
LYMPHOCYTES # BLD AUTO: 24.7 % — SIGNIFICANT CHANGE UP (ref 13–44)
MCHC RBC-ENTMCNC: 29 PG — SIGNIFICANT CHANGE UP (ref 27–34)
MCHC RBC-ENTMCNC: 29.3 GM/DL — LOW (ref 32–36)
MCV RBC AUTO: 99.1 FL — SIGNIFICANT CHANGE UP (ref 80–100)
METHOD TYPE: SIGNIFICANT CHANGE UP
MONOCYTES # BLD AUTO: 0.5 K/UL — SIGNIFICANT CHANGE UP (ref 0–0.9)
MONOCYTES NFR BLD AUTO: 6.9 % — SIGNIFICANT CHANGE UP (ref 1–9)
NEUTROPHILS # BLD AUTO: 4.7 K/UL — SIGNIFICANT CHANGE UP (ref 1.8–7.4)
NEUTROPHILS NFR BLD AUTO: 65 % — SIGNIFICANT CHANGE UP (ref 43–77)
ORGANISM # SPEC MICROSCOPIC CNT: SIGNIFICANT CHANGE UP
ORGANISM # SPEC MICROSCOPIC CNT: SIGNIFICANT CHANGE UP
PLATELET # BLD AUTO: 188 K/UL — SIGNIFICANT CHANGE UP (ref 150–400)
POTASSIUM SERPL-MCNC: 4.2 MMOL/L — SIGNIFICANT CHANGE UP (ref 3.5–5.3)
POTASSIUM SERPL-SCNC: 4.2 MMOL/L — SIGNIFICANT CHANGE UP (ref 3.5–5.3)
RBC # BLD: 3.11 M/UL — LOW (ref 3.8–5.2)
RBC # FLD: 14.4 % — SIGNIFICANT CHANGE UP (ref 10.3–14.5)
SODIUM SERPL-SCNC: 143 MMOL/L — SIGNIFICANT CHANGE UP (ref 135–145)
SPECIMEN SOURCE: SIGNIFICANT CHANGE UP
TIBC SERPL-MCNC: 364 UG/DL — SIGNIFICANT CHANGE UP (ref 220–430)
UIBC SERPL-MCNC: 311 UG/DL — SIGNIFICANT CHANGE UP (ref 110–370)
WBC # BLD: 7.3 K/UL — SIGNIFICANT CHANGE UP (ref 3.8–10.5)
WBC # FLD AUTO: 7.3 K/UL — SIGNIFICANT CHANGE UP (ref 3.8–10.5)

## 2017-10-05 PROCEDURE — 99233 SBSQ HOSP IP/OBS HIGH 50: CPT

## 2017-10-05 RX ORDER — CALCIUM CARBONATE 500(1250)
1 TABLET ORAL THREE TIMES A DAY
Qty: 0 | Refills: 0 | Status: DISCONTINUED | OUTPATIENT
Start: 2017-10-05 | End: 2017-10-10

## 2017-10-05 RX ORDER — ENOXAPARIN SODIUM 100 MG/ML
30 INJECTION SUBCUTANEOUS
Qty: 0 | Refills: 0 | COMMUNITY
Start: 2017-10-05

## 2017-10-05 RX ORDER — FERROUS SULFATE 325(65) MG
325 TABLET ORAL DAILY
Qty: 0 | Refills: 0 | Status: DISCONTINUED | OUTPATIENT
Start: 2017-10-05 | End: 2017-10-10

## 2017-10-05 RX ORDER — DOCUSATE SODIUM 100 MG
1 CAPSULE ORAL
Qty: 0 | Refills: 0 | COMMUNITY
Start: 2017-10-05

## 2017-10-05 RX ORDER — METOPROLOL TARTRATE 50 MG
25 TABLET ORAL DAILY
Qty: 0 | Refills: 0 | Status: DISCONTINUED | OUTPATIENT
Start: 2017-10-05 | End: 2017-10-07

## 2017-10-05 RX ORDER — METOPROLOL TARTRATE 50 MG
1 TABLET ORAL
Qty: 0 | Refills: 0 | COMMUNITY
Start: 2017-10-05

## 2017-10-05 RX ORDER — ASCORBIC ACID 60 MG
1 TABLET,CHEWABLE ORAL
Qty: 0 | Refills: 0 | COMMUNITY
Start: 2017-10-05

## 2017-10-05 RX ORDER — ASCORBIC ACID 60 MG
500 TABLET,CHEWABLE ORAL DAILY
Qty: 0 | Refills: 0 | Status: DISCONTINUED | OUTPATIENT
Start: 2017-10-05 | End: 2017-10-10

## 2017-10-05 RX ORDER — LOSARTAN POTASSIUM 100 MG/1
12.5 TABLET, FILM COATED ORAL DAILY
Qty: 0 | Refills: 0 | Status: DISCONTINUED | OUTPATIENT
Start: 2017-10-05 | End: 2017-10-07

## 2017-10-05 RX ORDER — FERROUS SULFATE 325(65) MG
1 TABLET ORAL
Qty: 0 | Refills: 0 | COMMUNITY
Start: 2017-10-05

## 2017-10-05 RX ORDER — LOSARTAN POTASSIUM 100 MG/1
12.5 TABLET, FILM COATED ORAL
Qty: 0 | Refills: 0 | COMMUNITY
Start: 2017-10-05

## 2017-10-05 RX ORDER — FUROSEMIDE 40 MG
40 TABLET ORAL ONCE
Qty: 0 | Refills: 0 | Status: COMPLETED | OUTPATIENT
Start: 2017-10-05 | End: 2017-10-05

## 2017-10-05 RX ORDER — ERYTHROPOIETIN 10000 [IU]/ML
10000 INJECTION, SOLUTION INTRAVENOUS; SUBCUTANEOUS
Qty: 0 | Refills: 0 | Status: DISCONTINUED | OUTPATIENT
Start: 2017-10-05 | End: 2017-10-10

## 2017-10-05 RX ORDER — LACTOBACILLUS ACIDOPHILUS 100MM CELL
1 CAPSULE ORAL
Qty: 0 | Refills: 0 | COMMUNITY
Start: 2017-10-05

## 2017-10-05 RX ADMIN — CEFTRIAXONE 100 GRAM(S): 500 INJECTION, POWDER, FOR SOLUTION INTRAMUSCULAR; INTRAVENOUS at 13:20

## 2017-10-05 RX ADMIN — Medication 1: at 08:40

## 2017-10-05 RX ADMIN — ENOXAPARIN SODIUM 30 MILLIGRAM(S): 100 INJECTION SUBCUTANEOUS at 13:20

## 2017-10-05 RX ADMIN — Medication 1 TABLET(S): at 18:58

## 2017-10-05 RX ADMIN — Medication 1 TABLET(S): at 08:43

## 2017-10-05 RX ADMIN — LOSARTAN POTASSIUM 50 MILLIGRAM(S): 100 TABLET, FILM COATED ORAL at 06:07

## 2017-10-05 RX ADMIN — Medication 88 MICROGRAM(S): at 06:07

## 2017-10-05 RX ADMIN — Medication 1 MILLIGRAM(S): at 13:20

## 2017-10-05 RX ADMIN — Medication 40 MILLIGRAM(S): at 14:15

## 2017-10-05 RX ADMIN — ERYTHROPOIETIN 10000 UNIT(S): 10000 INJECTION, SOLUTION INTRAVENOUS; SUBCUTANEOUS at 20:07

## 2017-10-05 RX ADMIN — ATORVASTATIN CALCIUM 10 MILLIGRAM(S): 80 TABLET, FILM COATED ORAL at 22:07

## 2017-10-05 RX ADMIN — Medication 100 MILLIGRAM(S): at 06:07

## 2017-10-05 RX ADMIN — Medication 1 TABLET(S): at 18:57

## 2017-10-05 RX ADMIN — Medication 100 MILLIGRAM(S): at 18:57

## 2017-10-05 RX ADMIN — Medication 81 MILLIGRAM(S): at 13:20

## 2017-10-05 NOTE — PROGRESS NOTE ADULT - SUBJECTIVE AND OBJECTIVE BOX
HPI:  86 yo f ,  , lives with dtr , in her own apartment , former smoker , hx of diabetes mellitus type 2 , osteoarthritis , hypertension , osteoporosis , obesity , cabg , ppm, and severe MR  patient has had an episode of hypoglycemia last  ems called to house and was given glucagon and recovered ,  , then today confused , and with change in mental status ,no syncope , and with low fs glucometer test and brought to er , given d50 and  , later in er alert and responsive but fuggy , eating some cookies ,   she takes 25 units Lantus bid , has not been by md in months , has difficulty ambulating , no n/v , no cp , no sob , no head aches , no rash , no fever , no chills , no dysuria , (03 Oct 2017 23:55)    SUBJECTIVE:  Patient is a 85y old  Female who presents with a chief complaint of hypoglycemia , change in mental status (04 Oct 2017 15:49)    Sitting on the chair, comfortable on RA.  Denies CP or SOB.  However, c/o CHILDERS and palpitations on ambulation.  C/O orthopnea.    OBJECTIVE:  Review Of Systems:  Constitutional: [ ] Fever [ ] Chills [ ] Fatigue [ ] Weight change   HEENT: [ ] Blurred vision [ ] Eye Pain [ ] Headache [ ] Runny nose [ ] Sore Throat   Respiratory: [ ] Cough [ ] Wheezing [ ] Shortness of breath  Cardiovascular: [ ] Chest Pain [ ] Palpitations [ ] CHILDERS [ ] PND [ ] Orthopnea  Gastrointestinal: [ ] Abdominal Pain [ ] Diarrhea [ ] Constipation [ ] Hemorrhoids [ ] Nausea [ ] Vomiting  Genitourinary: [ ] Nocturia [ ] Dysuria [ ] Incontinence  Extremities: [ ] Swelling [ ] Joint Pain  Neurologic: [ ] Focal deficit [ ] Paresthesias [ ] Syncope  Lymphatic: [ ] Swelling [ ] Lymphadenopathy   Skin: [ ] Rash [ ] Ecchymoses [ ] Wounds [ ] Lesions  Psychiatry: [ ] Depression [ ] Suicidal/Homicidal Ideation [ ] Anxiety [ ] Sleep Disturbances  [x ] 10 point review of systems is otherwise negative except as mentioned above            [ ]Unable to obtain    Allergy:  Allergies    No Known Allergies    Intolerances        Medications:  MEDICATIONS  (STANDING):  ascorbic acid 500 milliGRAM(s) Oral daily  aspirin  chewable 81 milliGRAM(s) Oral daily  atorvastatin 10 milliGRAM(s) Oral at bedtime  cefTRIAXone   IVPB 1 Gram(s) IV Intermittent every 24 hours  dextrose 50% Injectable 12.5 Gram(s) IV Push once  dextrose 50% Injectable 25 Gram(s) IV Push once  dextrose 50% Injectable 25 Gram(s) IV Push once  docusate sodium 100 milliGRAM(s) Oral two times a day  enoxaparin Injectable 30 milliGRAM(s) SubCutaneous daily  epoetin kori Injectable 99781 Unit(s) SubCutaneous <User Schedule>  ferrous    sulfate 325 milliGRAM(s) Oral daily  folic acid 1 milliGRAM(s) Oral daily  influenza   Vaccine 0.5 milliLiter(s) IntraMuscular once  insulin lispro (HumaLOG) corrective regimen sliding scale   SubCutaneous three times a day before meals  insulin lispro (HumaLOG) corrective regimen sliding scale   SubCutaneous at bedtime  lactobacillus acidophilus 1 Tablet(s) Oral two times a day with meals  levothyroxine 88 MICROGram(s) Oral daily  losartan 12.5 milliGRAM(s) Oral daily  Nephro-jonas 1 Tablet(s) Oral daily    MEDICATIONS  (PRN):  acetaminophen   Tablet 650 milliGRAM(s) Oral every 6 hours PRN For Temp greater than 38 C (100.4 F)  dextrose Gel 1 Dose(s) Oral once PRN Blood Glucose LESS THAN 70 milliGRAM(s)/deciliter  glucagon  Injectable 1 milliGRAM(s) IntraMuscular once PRN Glucose LESS THAN 70 milligrams/deciliter    PMH/PSH/FH/SH: [ ] Unchanged    Vitals:  T(C): 37.3 (10-05-17 @ 04:57), Max: 37.3 (10-04-17 @ 20:02)  HR: 83 (10-05-17 @ 04:57) (83 - 88)  BP: 116/70 (10-05-17 @ 04:57) (110/69 - 125/73)  BP(mean): --  RR: 15 (10-05-17 @ 04:57) (15 - 15)  SpO2: 96% (10-05-17 @ 04:57) (95% - 98%)  Wt(kg): --  Daily     Daily Weight in k.2 (04 Oct 2017 14:02)  I&O's Summary    04 Oct 2017 07:01  -  05 Oct 2017 07:00  --------------------------------------------------------  IN: 500 mL / OUT: 0 mL / NET: 500 mL    Labs:                        9.0    7.3   )-----------( 188      ( 05 Oct 2017 08:00 )             30.8     10-05    143  |  108  |  31<H>  ----------------------------<  157<H>  4.2   |  26  |  2.70<H>    Ca    8.3<L>      05 Oct 2017 08:00    TPro  6.8  /  Alb  3.2<L>  /  TBili  0.5  /  DBili  x   /  AST  23  /  ALT  13  /  AlkPhos  26<L>  10-03    ECG:    Echo: Pending Official reading    Stress Testing:     Cath:    Imaging:    Interpretation of Telemetry:  NSR at 80's with PAC's    Physical Exam:  Appearance: [ ] Normal  [ ] abnormal [x ] NAD  [x] Morbidly obese  Eyes: [ x] PERRL [ ] EOMI  HENT: [ x] Normal [ ] Abnormal oral muscosa [ ]NC/AT  Cardiovascular: [ x] S1 [x ] S2 [x ] RRR [x ] m/r/g [ ]edema [ ] JVP  Procedural Access Site: [ ]  hematoma [ ] tender to palpation [ ] 2+ pulse [ ] bruit [ ] Ecchymosis  Respiratory: [] Clear to auscultation bilaterally  [x] Rales at bases L>R  Gastrointestinal: [ x] Soft [ ] tenderness[ ] distension [ x] BS  Musculoskeletal: [ ] clubbing [ ] joint deformity   Neurologic: [ ] Non-focal  Lymphatic: [ ] lymphadenopathy  Psychiatry: [x ] AAOx3  [ ] confused [ ] disoriented [x ] Mood & affect appropriate  Skin: [ ]  rashes [ ] ecchymoses [ ] cyanosis

## 2017-10-05 NOTE — PROGRESS NOTE ADULT - ASSESSMENT
86 yo f ,  , lives with dtr , in her own apartment , former smoker , hx of diabetes mellitus type 2 , osteoarthritis , hypertension , osteoporosis , obesity , cabg , ppm, and severe MR ckd and el

## 2017-10-05 NOTE — PROGRESS NOTE ADULT - ASSESSMENT
85 F w hx of CAD s/p CABG, HTN, DM, HLD, mild ICM, severe MR, hypothyroid, PPM presents for near syncope/AMS.    - Evidence of fluid overload noted  - Discontinue IV hydration  - Will give Lasix IV today and evaluate prn for need.  Received Lasix for the past 2 days  - Monitor strict I & O and daily weights  - Monitor renal function; Losartan reintroduced by Renal  - Anemia with hgb 10 -> 8.8 most likely dilutional 2/2 fluid overload and receipt of IVF will check orthostatics and repeat in am other  than stable hematoma on tongue no other evidence of bleeding.   - Doppler of BLE negative of DVT  - Cont current DM meds.  - Abx for UTI per primary  - Follow up echo  - PT does not want to pursue a mitral clip  - [employer] Rep called for interrogation completed no AF noted or events noted.   - Start Toprol XL 25 2/2 ICM awaiting Echo to evaluate EF.    - Discontinue tele  - c/w VTE prophylaxis  - Monitor and replete electrolytes. Keep K>4.0 and Mg>2.0.  - Further cardiac workup will depend on clinical course.  - All other workup per primary team. Will followup.     Estrella Ellis NP  Cardiology 85 F w hx of CAD s/p CABG, HTN, DM, HLD, mild ICM, severe MR, hypothyroid, PPM presents for near syncope/AMS.    - remains edematous  - ifv now off and has gotten lasix several days in a row  - Will give Lasix IV today and evaluate prn for need.  Received Lasix for the past 2 days  - Monitor strict I & O and daily weights  - Monitor renal function; Losartan reintroduced by Renal  - Anemia with hgb 10 -> 8.8 most likely dilutional 2/2 fluid overload and receipt of IVF will check orthostatics and repeat in am other  than stable hematoma on tongue no other evidence of bleeding.   - Doppler of BLE negative of DVT  - Cont current DM meds.  - Abx for UTI per primary  - Follow up echo  - PT does not want to pursue a mitral clip  - [employer] called for interrogation completed no AF noted or events noted.   - Start Toprol XL 25 2/2 ICM awaiting Echo to evaluate EF.    - Discontinue tele  - c/w VTE prophylaxis  - Monitor and replete electrolytes. Keep K>4.0 and Mg>2.0.  - Further cardiac workup will depend on clinical course.  - All other workup per primary team. Will followup.     Estrella Ellis NP  Cardiology

## 2017-10-05 NOTE — PROGRESS NOTE ADULT - SUBJECTIVE AND OBJECTIVE BOX
PCP  Subjective:   in chair , feels better , slight swelling rt hand     Objective:   T(F): 98.3 (10-05-17 @ 14:36), Max: 99.2 (10-05-17 @ 04:57)  HR: 93 (10-05-17 @ 14:36) (76 - 107)  BP: 100/58 (10-05-17 @ 14:36) (100/58 - 129/73)  RR: 15 (10-05-17 @ 14:36) (15 - 18)  SpO2: 96% (10-05-17 @ 14:36) (94% - 98%)  Wt(kg): --  Daily     Daily     GENERAL:  wdwn f, awake , in chair , dtr at bed side  EYES: eomi  NECK: no masses  CHEST/LUNG: clear upper , slight rales in bases  HEART: s1 s2 regular 2/6 feng    ABDOMEN: soft nt obese , panus  EXTREMITIES:  no edema, slight infiltrate rt hand non tender  SKIN: warm , no rash  CNS: awake , alert non focal ,     Allergies: Allergies    No Known Allergies    Intolerances        Medications:   acetaminophen   Tablet 650 milliGRAM(s) Oral every 6 hours PRN  ascorbic acid 500 milliGRAM(s) Oral daily  aspirin  chewable 81 milliGRAM(s) Oral daily  atorvastatin 10 milliGRAM(s) Oral at bedtime  cefTRIAXone   IVPB 1 Gram(s) IV Intermittent every 24 hours  dextrose 50% Injectable 12.5 Gram(s) IV Push once  dextrose 50% Injectable 25 Gram(s) IV Push once  dextrose 50% Injectable 25 Gram(s) IV Push once  dextrose Gel 1 Dose(s) Oral once PRN  docusate sodium 100 milliGRAM(s) Oral two times a day  enoxaparin Injectable 30 milliGRAM(s) SubCutaneous daily  epoetin kori Injectable 90057 Unit(s) SubCutaneous <User Schedule>  ferrous    sulfate 325 milliGRAM(s) Oral daily  folic acid 1 milliGRAM(s) Oral daily  glucagon  Injectable 1 milliGRAM(s) IntraMuscular once PRN  influenza   Vaccine 0.5 milliLiter(s) IntraMuscular once  insulin lispro (HumaLOG) corrective regimen sliding scale   SubCutaneous three times a day before meals  insulin lispro (HumaLOG) corrective regimen sliding scale   SubCutaneous at bedtime  lactobacillus acidophilus 1 Tablet(s) Oral two times a day with meals  levothyroxine 88 MICROGram(s) Oral daily  losartan 12.5 milliGRAM(s) Oral daily  metoprolol succinate ER 25 milliGRAM(s) Oral daily  Nephro-jonas 1 Tablet(s) Oral daily      LABS:                        9.0    7.3   )-----------( 188      ( 05 Oct 2017 08:00 )             30.8     10-05    143  |  108  |  31<H>  ----------------------------<  157<H>  4.2   |  26  |  2.70<H>    Ca    8.3<L>      05 Oct 2017 08:00              CAPILLARY BLOOD GLUCOSE  143 (05 Oct 2017 12:29)  154 (05 Oct 2017 07:44)  147 (04 Oct 2017 21:06)  150 (04 Oct 2017 16:44)                RECENT CULTURES:  Culture Results:   No growth to date. (10-03 @ 18:20)  Culture Results:   No growth to date. (10-03 @ 18:20)  Culture Results:   >100,000 CFU/ml Escherichia coli (10-03 @ 18:11)

## 2017-10-05 NOTE — PROGRESS NOTE ADULT - ASSESSMENT
84 yo f ,  , lives with dtr , in her own apartment , former smoker , hx of diabetes mellitus type 2 , osteoarthritis , hypertension , osteoporosis , obesity , cabg , ppm, and severe MR   patient has had an episode of hypoglycemia last Sunday ems called to house and was given glucagon , then today confused , and with change in mental status , and with low fs glucometer test and brought to er , given d50 and recovered  , later in er alert and responsive but fuggy ,   she takes 25 units Lantus bid , has not been by md in months , has difficulty ambulating , no n/v , no cp , no sob , no head aches , no rash , no fever , no chills , no dysuria ,   in er also abnl gfr , and anemia ,   on 10/4/2017 patient stable ,   blood sugar better ,   TITA on ckd , nephro to follow up , hydrate , and ck renal sono ,   dm fs and coverage hold lantus for now ,  coronary artery disease and MR , cardio to eval resume bp meds and asa , get echo    dvt and gi prophylax , and get PT eval , consider rehab  ? uti on iv abx , f/u cultures ,   on 10/5/2017 feels better , dm noted , hg a1c low , will dc insulin , and fs , and uti on iv abx   ckd and tita on ckd nephro noted , , case rev with dtr , dvt gi prophylax , plan rehab

## 2017-10-05 NOTE — PROGRESS NOTE ADULT - SUBJECTIVE AND OBJECTIVE BOX
Patient is a 85y Female with past medical history of           presenting with        who reports no complaints overnight.    REVIEW OF SYSTEMS:    CONSTITUTIONAL: No  fevers or chills    Allergies    No Known Allergies    Intolerances        MEDICATIONS  (STANDING):  ascorbic acid 500 milliGRAM(s) Oral daily  aspirin  chewable 81 milliGRAM(s) Oral daily  atorvastatin 10 milliGRAM(s) Oral at bedtime  cefTRIAXone   IVPB 1 Gram(s) IV Intermittent every 24 hours  dextrose 50% Injectable 12.5 Gram(s) IV Push once  dextrose 50% Injectable 25 Gram(s) IV Push once  dextrose 50% Injectable 25 Gram(s) IV Push once  docusate sodium 100 milliGRAM(s) Oral two times a day  enoxaparin Injectable 30 milliGRAM(s) SubCutaneous daily  epoetin kori Injectable 32037 Unit(s) SubCutaneous <User Schedule>  ferrous    sulfate 325 milliGRAM(s) Oral daily  folic acid 1 milliGRAM(s) Oral daily  influenza   Vaccine 0.5 milliLiter(s) IntraMuscular once  insulin lispro (HumaLOG) corrective regimen sliding scale   SubCutaneous three times a day before meals  insulin lispro (HumaLOG) corrective regimen sliding scale   SubCutaneous at bedtime  lactobacillus acidophilus 1 Tablet(s) Oral two times a day with meals  levothyroxine 88 MICROGram(s) Oral daily  losartan 12.5 milliGRAM(s) Oral daily  Nephro-jonas 1 Tablet(s) Oral daily      Vitals:  T(F): 99.2 (10-05-17 @ 04:57), Max: 99.2 (10-05-17 @ 04:57)  HR: 83 (10-05-17 @ 04:57)  BP: 116/70 (10-05-17 @ 04:57)  BP(mean): --  RR: 15 (10-05-17 @ 04:57)  SpO2: 96% (10-05-17 @ 04:57)  Wt(kg): --    I and O's:    10-04 @ 07:01  -  10-05 @ 07:00  --------------------------------------------------------  IN: 500 mL / OUT: 0 mL / NET: 500 mL            PHYSICAL EXAM:    Constitutional: NAD,   Neck: No JVD  Respiratory: CTAB  Cardiovascular: S1 and S2  Gastrointestinal: BS+, soft, NT/ND  Extremities: No peripheral edema  Neurological:  no focal deficits    LABS:                        9.0    7.3   )-----------( 188      ( 05 Oct 2017 08:00 )             30.8                         8.8    8.6   )-----------( 191      ( 04 Oct 2017 07:47 )             29.5       143    |  108    |  31     ----------------------------<  157       05 Oct 2017 08:00  4.2     |  26     |  2.70     146    |  113    |  27     ----------------------------<  146       04 Oct 2017 07:47  4.7     |  24     |  2.40     143    |  111    |  33     ----------------------------<  178       03 Oct 2017 13:43  5.1     |  26     |  3.00     Ca    8.3        05 Oct 2017 08:00  Ca    7.9        04 Oct 2017 07:47        TPro  6.8    /  Alb  3.2    /  TBili  0.5    /        03 Oct 2017 13:43  DBili  x      /  AST  23     /  ALT  13     /  AlkPhos  26           Serum Osmo:   Serum Uric Acid:   PERRI:   Double Stranded DNA:   C3:   C3 Nephritic Factor:   C4:   p-ANCA:   c-ANCA:   Anti-GBM:   ODIN-Smith Antibody:   Immunofixation:   [place]/Lambda Free Light Chain:   SPEP:   Hepatitis Panel:   HIV-1/2:     Urine Studies:  Urinalysis Basic - ( 03 Oct 2017 13:43 )    Color: Yellow / Appearance: Clear / S.020 / pH: x  Gluc: x / Ketone: Negative  / Bili: Negative / Urobili: Negative   Blood: x / Protein: 25 mg/dL / Nitrite: Negative   Leuk Esterase: Moderate / RBC: 3-5 /HPF / WBC >50   Sq Epi: x / Non Sq Epi: Moderate / Bacteria: TNTC        Urine chemistry:   Urine Na:   Urine Creatinine:   Urine Protein/Cr ratio:  Urine K:   Urine Osm:   24 Hr urine studies:     24 hr urine Creatinine Clearance:    RADIOLOGY & ADDITIONAL STUDIES:

## 2017-10-06 LAB
PHOSPHATE SERPL-MCNC: 2.7 MG/DL — SIGNIFICANT CHANGE UP (ref 2.5–4.5)
PTH-INTACT FLD-MCNC: 165 PG/ML — HIGH (ref 15–65)

## 2017-10-06 PROCEDURE — 99233 SBSQ HOSP IP/OBS HIGH 50: CPT

## 2017-10-06 RX ORDER — CEFUROXIME AXETIL 250 MG
250 TABLET ORAL EVERY 12 HOURS
Qty: 0 | Refills: 0 | Status: DISCONTINUED | OUTPATIENT
Start: 2017-10-06 | End: 2017-10-10

## 2017-10-06 RX ORDER — CEFUROXIME AXETIL 250 MG
500 TABLET ORAL EVERY 12 HOURS
Qty: 0 | Refills: 0 | Status: DISCONTINUED | OUTPATIENT
Start: 2017-10-06 | End: 2017-10-06

## 2017-10-06 RX ORDER — CALCIUM CARBONATE 500(1250)
1 TABLET ORAL
Qty: 0 | Refills: 0 | COMMUNITY
Start: 2017-10-06

## 2017-10-06 RX ORDER — CEFUROXIME AXETIL 250 MG
0.5 TABLET ORAL
Qty: 0 | Refills: 0 | COMMUNITY
Start: 2017-10-06

## 2017-10-06 RX ORDER — FUROSEMIDE 40 MG
1 TABLET ORAL
Qty: 0 | Refills: 0 | COMMUNITY
Start: 2017-10-06

## 2017-10-06 RX ORDER — CALCITRIOL 0.5 UG/1
0.25 CAPSULE ORAL
Qty: 0 | Refills: 0 | Status: DISCONTINUED | OUTPATIENT
Start: 2017-10-06 | End: 2017-10-09

## 2017-10-06 RX ORDER — FUROSEMIDE 40 MG
40 TABLET ORAL DAILY
Qty: 0 | Refills: 0 | Status: DISCONTINUED | OUTPATIENT
Start: 2017-10-06 | End: 2017-10-07

## 2017-10-06 RX ORDER — CEFUROXIME AXETIL 250 MG
1 TABLET ORAL
Qty: 0 | Refills: 0 | COMMUNITY
Start: 2017-10-06

## 2017-10-06 RX ADMIN — Medication 1 TABLET(S): at 22:08

## 2017-10-06 RX ADMIN — Medication 100 MILLIGRAM(S): at 17:13

## 2017-10-06 RX ADMIN — Medication 500 MILLIGRAM(S): at 17:14

## 2017-10-06 RX ADMIN — Medication 88 MICROGRAM(S): at 05:30

## 2017-10-06 RX ADMIN — Medication 1 TABLET(S): at 08:25

## 2017-10-06 RX ADMIN — Medication 1 TABLET(S): at 17:14

## 2017-10-06 RX ADMIN — Medication 1 TABLET(S): at 12:33

## 2017-10-06 RX ADMIN — ENOXAPARIN SODIUM 30 MILLIGRAM(S): 100 INJECTION SUBCUTANEOUS at 12:33

## 2017-10-06 RX ADMIN — Medication 40 MILLIGRAM(S): at 17:14

## 2017-10-06 RX ADMIN — Medication 81 MILLIGRAM(S): at 12:33

## 2017-10-06 RX ADMIN — Medication 500 MILLIGRAM(S): at 12:33

## 2017-10-06 RX ADMIN — Medication 25 MILLIGRAM(S): at 05:30

## 2017-10-06 RX ADMIN — Medication 325 MILLIGRAM(S): at 12:33

## 2017-10-06 RX ADMIN — Medication 100 MILLIGRAM(S): at 05:31

## 2017-10-06 RX ADMIN — Medication 1 MILLIGRAM(S): at 12:33

## 2017-10-06 RX ADMIN — ATORVASTATIN CALCIUM 10 MILLIGRAM(S): 80 TABLET, FILM COATED ORAL at 22:09

## 2017-10-06 RX ADMIN — LOSARTAN POTASSIUM 12.5 MILLIGRAM(S): 100 TABLET, FILM COATED ORAL at 05:30

## 2017-10-06 RX ADMIN — CALCITRIOL 0.25 MICROGRAM(S): 0.5 CAPSULE ORAL at 17:13

## 2017-10-06 RX ADMIN — Medication 1 TABLET(S): at 05:29

## 2017-10-06 NOTE — PROGRESS NOTE ADULT - ASSESSMENT
86 yo f ,  , lives with dtr , in her own apartment , former smoker , hx of diabetes mellitus type 2 , osteoarthritis , hypertension , osteoporosis , obesity , cabg , ppm, and severe MR   patient has had an episode of hypoglycemia last Sunday ems called to house and was given glucagon , then today confused , and with change in mental status , and with low fs glucometer test and brought to er , given d50 and recovered  , later in er alert and responsive but fuggy ,   she takes 25 units Lantus bid , has not been by md in months , has difficulty ambulating , no n/v , no cp , no sob , no head aches , no rash , no fever , no chills , no dysuria ,   in er also abnl gfr , and anemia ,   on 10/4/2017 patient stable ,   blood sugar better ,   TITA on ckd , nephro to follow up , hydrate , and ck renal sono ,   dm fs and coverage hold lantus for now ,  coronary artery disease and MR , cardio to eval resume bp meds and asa , get echo    dvt and gi prophylax , and get PT eval , consider rehab  ? uti on iv abx , f/u cultures ,   on 10/5/2017 feels better , dm noted , hg a1c low , will dc insulin , and fs , and uti on iv abx   ckd and tita on ckd nephro noted , , case rev with dtr , dvt gi prophylax , plan rehab   on  10/6/2017 doing better , plan rehab , uti on po abx ,

## 2017-10-06 NOTE — PROGRESS NOTE ADULT - SUBJECTIVE AND OBJECTIVE BOX
Patient is a 85y Female with past medical history of           presenting with        who reports no complaints overnight.    REVIEW OF SYSTEMS:    CONSTITUTIONAL: No  fevers or chills      Allergies    No Known Allergies    Intolerances        MEDICATIONS  (STANDING):  ascorbic acid 500 milliGRAM(s) Oral daily  aspirin  chewable 81 milliGRAM(s) Oral daily  atorvastatin 10 milliGRAM(s) Oral at bedtime  calcium carbonate 500 mG (Tums) Chewable 1 Tablet(s) Chew three times a day  cefuroxime   Tablet 500 milliGRAM(s) Oral every 12 hours  dextrose 50% Injectable 12.5 Gram(s) IV Push once  dextrose 50% Injectable 25 Gram(s) IV Push once  dextrose 50% Injectable 25 Gram(s) IV Push once  docusate sodium 100 milliGRAM(s) Oral two times a day  enoxaparin Injectable 30 milliGRAM(s) SubCutaneous daily  epoetin kori Injectable 35195 Unit(s) SubCutaneous <User Schedule>  ferrous    sulfate 325 milliGRAM(s) Oral daily  folic acid 1 milliGRAM(s) Oral daily  furosemide    Tablet 40 milliGRAM(s) Oral daily  lactobacillus acidophilus 1 Tablet(s) Oral two times a day with meals  levothyroxine 88 MICROGram(s) Oral daily  losartan 12.5 milliGRAM(s) Oral daily  metoprolol succinate ER 25 milliGRAM(s) Oral daily  Nephro-jonas 1 Tablet(s) Oral daily      Vitals:  T(F): 98.3 (10-06-17 @ 13:10), Max: 98.4 (10-05-17 @ 20:41)  HR: 88 (10-06-17 @ 13:10)  BP: 96/60 (10-06-17 @ 13:10)  BP(mean): --  RR: 16 (10-06-17 @ 13:10)  SpO2: 92% (10-06-17 @ 13:10)  Wt(kg): --    I and O's:    10-06 @ 07:01  -  10-06 @ 15:37  --------------------------------------------------------  IN: 0 mL / OUT: 0 mL / NET: 0 mL            PHYSICAL EXAM:    Constitutional: NAD,   Neck: No JVD  Respiratory: CTAB  Cardiovascular: S1 and S2  Gastrointestinal: BS+, soft, NT/ND  Extremities: No peripheral edema  Neurological:  no focal deficits    LABS:                        9.0    7.3   )-----------( 188      ( 05 Oct 2017 08:00 )             30.8       143    |  108    |  31     ----------------------------<  157       05 Oct 2017 08:00  4.2     |  26     |  2.70     146    |  113    |  27     ----------------------------<  146       04 Oct 2017 07:47  4.7     |  24     |  2.40     143    |  111    |  33     ----------------------------<  178       03 Oct 2017 13:43  5.1     |  26     |  3.00     Ca    8.3        05 Oct 2017 08:00  Ca    7.9        04 Oct 2017 07:47    Phos  2.7       06 Oct 2017 09:30      TPro  6.8    /  Alb  3.2    /  TBili  0.5    /        03 Oct 2017 13:43  DBili  x      /  AST  23     /  ALT  13     /  AlkPhos  26           Serum Osmo:   Serum Uric Acid:   PERRI:   Double Stranded DNA:   C3:   C3 Nephritic Factor:   C4:   p-ANCA:   c-ANCA:   Anti-GBM:   ODIN-Smith Antibody:   Immunofixation:   [place]/Lambda Free Light Chain:   SPEP:   Hepatitis Panel:   HIV-1/2:     Urine Studies:      Urine chemistry:   Urine Na:   Urine Creatinine:   Urine Protein/Cr ratio:  Urine K:   Urine Osm:   24 Hr urine studies:     24 hr urine Creatinine Clearance:    RADIOLOGY & ADDITIONAL STUDIES:

## 2017-10-06 NOTE — PROGRESS NOTE ADULT - ASSESSMENT
85 F w hx of CAD s/p CABG, HTN, DM, HLD, mild ICM, severe MR, hypothyroid, PPM presents for near syncope/AMS.    - TTE reviewed, poor study. EF 50% with mod-severe MR.  - still some sign of volume overload, but much improved  - s/p IV lasix for the last few days. I would restart her home dose of Lasix 40 po daily at time of d/c. If she is going to remain in hospital, then I would give Lasix 40 IV daily.  - Monitor strict I & O and daily weights  - Monitor renal function; Losartan reintroduced by Renal  - Doppler of BLE negative of DVT  - Cont current DM meds.  - Abx for UTI per primary  - Follow up echo  - PT does not want to pursue a mitral clip  - [employer] Rep called for interrogation completed no AF noted or events noted.   - Toprol XL 25  - Monitor and replete electrolytes. Keep K>4.0 and Mg>2.0.  - Further cardiac workup will depend on clinical course.  - All other workup per primary team. Will followup.

## 2017-10-06 NOTE — PROGRESS NOTE ADULT - SUBJECTIVE AND OBJECTIVE BOX
[facility] - Re Lowery, Sanchez, John, Karen, Gopi Rooney  Office Number:  [phone]    Patient resting comfortably in bed in NAD.  Laying flat with no respiratory distress.  No complaints of chest pain, dyspnea, palpitations, PND, or orthopnea.    ROS: negative unless otherwise mentioned.    Telemetry:      MEDICATIONS  (STANDING):  ascorbic acid 500 milliGRAM(s) Oral daily  aspirin  chewable 81 milliGRAM(s) Oral daily  atorvastatin 10 milliGRAM(s) Oral at bedtime  calcium carbonate 500 mG (Tums) Chewable 1 Tablet(s) Chew three times a day  cefuroxime   Tablet 500 milliGRAM(s) Oral every 12 hours  dextrose 50% Injectable 12.5 Gram(s) IV Push once  dextrose 50% Injectable 25 Gram(s) IV Push once  dextrose 50% Injectable 25 Gram(s) IV Push once  docusate sodium 100 milliGRAM(s) Oral two times a day  enoxaparin Injectable 30 milliGRAM(s) SubCutaneous daily  epoetin kori Injectable 84595 Unit(s) SubCutaneous <User Schedule>  ferrous    sulfate 325 milliGRAM(s) Oral daily  folic acid 1 milliGRAM(s) Oral daily  lactobacillus acidophilus 1 Tablet(s) Oral two times a day with meals  levothyroxine 88 MICROGram(s) Oral daily  losartan 12.5 milliGRAM(s) Oral daily  metoprolol succinate ER 25 milliGRAM(s) Oral daily  Nephro-jonas 1 Tablet(s) Oral daily    MEDICATIONS  (PRN):  acetaminophen   Tablet 650 milliGRAM(s) Oral every 6 hours PRN For Temp greater than 38 C (100.4 F)  dextrose Gel 1 Dose(s) Oral once PRN Blood Glucose LESS THAN 70 milliGRAM(s)/deciliter  glucagon  Injectable 1 milliGRAM(s) IntraMuscular once PRN Glucose LESS THAN 70 milligrams/deciliter      Allergies    No Known Allergies    Intolerances        Vital Signs Last 24 Hrs  T(C): 36.8 (06 Oct 2017 13:10), Max: 36.9 (05 Oct 2017 20:41)  T(F): 98.3 (06 Oct 2017 13:10), Max: 98.4 (05 Oct 2017 20:41)  HR: 88 (06 Oct 2017 13:10) (84 - 89)  BP: 96/60 (06 Oct 2017 13:10) (96/60 - 127/65)  BP(mean): --  RR: 16 (06 Oct 2017 13:10) (16 - 17)  SpO2: 92% (06 Oct 2017 13:10) (92% - 97%)    I&O's Summary    06 Oct 2017 07:01  -  06 Oct 2017 14:49  --------------------------------------------------------  IN: 0 mL / OUT: 0 mL / NET: 0 mL        ON EXAM:    Appearance: [ ] Normal  [ ] abnormal [x ] NAD  [x]  obese  Eyes: [ x] PERRL [ ] EOMI  HENT: [ x] Normal [ ] Abnormal oral [place] [ ]NC/AT  Cardiovascular: [ x] S1 [x ] S2 [x ] RRR [x ] m/r/g [x ]edema [ ] JVP  Procedural Access Site: [ ]  hematoma [ ] tender to palpation [ ] 2+ pulse [ ] bruit [ ] Ecchymosis  Respiratory: [] Clear to auscultation bilaterally  [] very subtle Rales at bases L>R, but much improved  Gastrointestinal: [ x] Soft [ ] tenderness[ ] distension [ x] BS  Musculoskeletal: [ ] clubbing [ ] joint deformity   Neurologic: [ ] Non-focal  Lymphatic: [ ] lymphadenopathy  Psychiatry: [x ] AAOx3  [ ] confused [ ] disoriented [x ] Mood & affect appropriate  Skin: [ ]  rashes [ ] ecchymoses [ ] cyanosis    LABS: All Labs Reviewed:                        9.0    7.3   )-----------( 188      ( 05 Oct 2017 08:00 )             30.8                         8.8    8.6   )-----------( 191      ( 04 Oct 2017 07:47 )             29.5     05 Oct 2017 08:00    143    |  108    |  31     ----------------------------<  157    4.2     |  26     |  2.70   04 Oct 2017 07:47    146    |  113    |  27     ----------------------------<  146    4.7     |  24     |  2.40     Ca    8.3        05 Oct 2017 08:00  Ca    7.9        04 Oct 2017 07:47  Phos  2.7       06 Oct 2017 09:30            Blood Culture: Organism --  Gram Stain Blood -- Gram Stain --  Specimen Source .Blood Blood-Peripheral  Culture-Blood --    Organism Escherichia coli  Gram Stain Blood -- Gram Stain --  Specimen Source .Urine Clean Catch (Midstream)  Culture-Blood --

## 2017-10-07 LAB
ANION GAP SERPL CALC-SCNC: 11 MMOL/L — SIGNIFICANT CHANGE UP (ref 5–17)
BUN SERPL-MCNC: 41 MG/DL — HIGH (ref 7–23)
CALCIUM SERPL-MCNC: 8.9 MG/DL — SIGNIFICANT CHANGE UP (ref 8.5–10.1)
CHLORIDE SERPL-SCNC: 101 MMOL/L — SIGNIFICANT CHANGE UP (ref 96–108)
CO2 SERPL-SCNC: 27 MMOL/L — SIGNIFICANT CHANGE UP (ref 22–31)
CREAT SERPL-MCNC: 3 MG/DL — HIGH (ref 0.5–1.3)
GLUCOSE SERPL-MCNC: 185 MG/DL — HIGH (ref 70–99)
HCT VFR BLD CALC: 32 % — LOW (ref 34.5–45)
HGB BLD-MCNC: 9.9 G/DL — LOW (ref 11.5–15.5)
MCHC RBC-ENTMCNC: 29.7 PG — SIGNIFICANT CHANGE UP (ref 27–34)
MCHC RBC-ENTMCNC: 30.9 GM/DL — LOW (ref 32–36)
MCV RBC AUTO: 95.9 FL — SIGNIFICANT CHANGE UP (ref 80–100)
PLATELET # BLD AUTO: 187 K/UL — SIGNIFICANT CHANGE UP (ref 150–400)
POTASSIUM SERPL-MCNC: 3.9 MMOL/L — SIGNIFICANT CHANGE UP (ref 3.5–5.3)
POTASSIUM SERPL-SCNC: 3.9 MMOL/L — SIGNIFICANT CHANGE UP (ref 3.5–5.3)
RBC # BLD: 3.34 M/UL — LOW (ref 3.8–5.2)
RBC # FLD: 14.3 % — SIGNIFICANT CHANGE UP (ref 10.3–14.5)
SODIUM SERPL-SCNC: 139 MMOL/L — SIGNIFICANT CHANGE UP (ref 135–145)
WBC # BLD: 8.1 K/UL — SIGNIFICANT CHANGE UP (ref 3.8–10.5)
WBC # FLD AUTO: 8.1 K/UL — SIGNIFICANT CHANGE UP (ref 3.8–10.5)

## 2017-10-07 PROCEDURE — 99233 SBSQ HOSP IP/OBS HIGH 50: CPT

## 2017-10-07 RX ORDER — METOPROLOL TARTRATE 50 MG
25 TABLET ORAL DAILY
Qty: 0 | Refills: 0 | Status: DISCONTINUED | OUTPATIENT
Start: 2017-10-07 | End: 2017-10-10

## 2017-10-07 RX ORDER — FUROSEMIDE 40 MG
40 TABLET ORAL DAILY
Qty: 0 | Refills: 0 | Status: DISCONTINUED | OUTPATIENT
Start: 2017-10-07 | End: 2017-10-07

## 2017-10-07 RX ADMIN — Medication 500 MILLIGRAM(S): at 12:21

## 2017-10-07 RX ADMIN — Medication 1 TABLET(S): at 13:54

## 2017-10-07 RX ADMIN — Medication 88 MICROGRAM(S): at 05:56

## 2017-10-07 RX ADMIN — ENOXAPARIN SODIUM 30 MILLIGRAM(S): 100 INJECTION SUBCUTANEOUS at 12:21

## 2017-10-07 RX ADMIN — Medication 1 TABLET(S): at 21:51

## 2017-10-07 RX ADMIN — Medication 1 TABLET(S): at 12:21

## 2017-10-07 RX ADMIN — ATORVASTATIN CALCIUM 10 MILLIGRAM(S): 80 TABLET, FILM COATED ORAL at 21:51

## 2017-10-07 RX ADMIN — Medication 100 MILLIGRAM(S): at 18:08

## 2017-10-07 RX ADMIN — ERYTHROPOIETIN 10000 UNIT(S): 10000 INJECTION, SOLUTION INTRAVENOUS; SUBCUTANEOUS at 13:54

## 2017-10-07 RX ADMIN — Medication 250 MILLIGRAM(S): at 18:08

## 2017-10-07 RX ADMIN — Medication 1 TABLET(S): at 09:04

## 2017-10-07 RX ADMIN — Medication 40 MILLIGRAM(S): at 05:58

## 2017-10-07 RX ADMIN — Medication 81 MILLIGRAM(S): at 12:21

## 2017-10-07 RX ADMIN — Medication 1 TABLET(S): at 05:56

## 2017-10-07 RX ADMIN — Medication 325 MILLIGRAM(S): at 12:21

## 2017-10-07 RX ADMIN — Medication 100 MILLIGRAM(S): at 05:56

## 2017-10-07 RX ADMIN — Medication 250 MILLIGRAM(S): at 05:56

## 2017-10-07 RX ADMIN — Medication 1 MILLIGRAM(S): at 12:21

## 2017-10-07 RX ADMIN — Medication 1 TABLET(S): at 18:08

## 2017-10-07 NOTE — PROGRESS NOTE ADULT - SUBJECTIVE AND OBJECTIVE BOX
PCP  Subjective:   in bed , awake     Objective:   T(F): 98.2 (10-07-17 @ 04:55), Max: 99.2 (10-05-17 @ 04:57)  HR: 78 (10-07-17 @ 05:48) (78 - 107)  BP: 108/58 (10-07-17 @ 05:48) (96/60 - 127/65)  RR: 16 (10-07-17 @ 04:55) (15 - 17)  SpO2: 96% (10-07-17 @ 04:55) (92% - 100%)  Wt(kg): --  Daily     Daily     GENERAL:  wdwn f , awake , responsive  EYES: eomi  NECK: supple  CHEST/LUNG: clear  HEART: s1 s2 regular  ABDOMEN: soft nt + bs  EXTREMITIES:  no edema  SKIN: warm   CNS: awake , alert non focal responsive     Allergies: Allergies    No Known Allergies    Intolerances        Medications:   acetaminophen   Tablet 650 milliGRAM(s) Oral every 6 hours PRN  ascorbic acid 500 milliGRAM(s) Oral daily  aspirin  chewable 81 milliGRAM(s) Oral daily  atorvastatin 10 milliGRAM(s) Oral at bedtime  calcitriol   Capsule 0.25 MICROGram(s) Oral every 48 hours  calcium carbonate 500 mG (Tums) Chewable 1 Tablet(s) Chew three times a day  cefuroxime   Tablet 250 milliGRAM(s) Oral every 12 hours  dextrose 50% Injectable 12.5 Gram(s) IV Push once  dextrose 50% Injectable 25 Gram(s) IV Push once  dextrose 50% Injectable 25 Gram(s) IV Push once  dextrose Gel 1 Dose(s) Oral once PRN  docusate sodium 100 milliGRAM(s) Oral two times a day  enoxaparin Injectable 30 milliGRAM(s) SubCutaneous daily  epoetin kori Injectable 07965 Unit(s) SubCutaneous <User Schedule>  ferrous    sulfate 325 milliGRAM(s) Oral daily  folic acid 1 milliGRAM(s) Oral daily  furosemide    Tablet 40 milliGRAM(s) Oral daily  glucagon  Injectable 1 milliGRAM(s) IntraMuscular once PRN  lactobacillus acidophilus 1 Tablet(s) Oral two times a day with meals  levothyroxine 88 MICROGram(s) Oral daily  losartan 12.5 milliGRAM(s) Oral daily  metoprolol succinate ER 25 milliGRAM(s) Oral daily  Nephro-jonas 1 Tablet(s) Oral daily      LABS:                        9.9    8.1   )-----------( 187      ( 07 Oct 2017 09:38 )             32.0     10-07    139  |  101  |  41<H>  ----------------------------<  185<H>  3.9   |  27  |  3.00<H>    Ca    8.9      07 Oct 2017 09:38  Phos  2.7     10-06              CAPILLARY BLOOD GLUCOSE  198 (07 Oct 2017 07:44)  196 (06 Oct 2017 21:18)  192 (06 Oct 2017 16:56)  198 (06 Oct 2017 11:53)                RECENT CULTURES:  Culture Results:   No growth to date. (10-03 @ 18:20)  Culture Results:   No growth to date. (10-03 @ 18:20)  Culture Results:   >100,000 CFU/ml Escherichia coli (10-03 @ 18:11)

## 2017-10-07 NOTE — PROGRESS NOTE ADULT - SUBJECTIVE AND OBJECTIVE BOX
[facility] - Re Lowery Grossman, [place], Karen, Gopi Rooney  Office Number:  [phone]    Patient resting comfortably in bed in NAD.  Laying flat with no respiratory distress.  No complaints of chest pain, dyspnea, palpitations, PND, or orthopnea.  Feeling well. Wants to go to rehab    ROS: negative unless otherwise mentioned.    Telemetry:  Not on tele    MEDICATIONS  (STANDING):  ascorbic acid 500 milliGRAM(s) Oral daily  aspirin  chewable 81 milliGRAM(s) Oral daily  atorvastatin 10 milliGRAM(s) Oral at bedtime  calcitriol   Capsule 0.25 MICROGram(s) Oral every 48 hours  calcium carbonate 500 mG (Tums) Chewable 1 Tablet(s) Chew three times a day  cefuroxime   Tablet 250 milliGRAM(s) Oral every 12 hours  dextrose 50% Injectable 12.5 Gram(s) IV Push once  dextrose 50% Injectable 25 Gram(s) IV Push once  dextrose 50% Injectable 25 Gram(s) IV Push once  docusate sodium 100 milliGRAM(s) Oral two times a day  enoxaparin Injectable 30 milliGRAM(s) SubCutaneous daily  epoetin kori Injectable 14861 Unit(s) SubCutaneous <User Schedule>  ferrous    sulfate 325 milliGRAM(s) Oral daily  folic acid 1 milliGRAM(s) Oral daily  furosemide    Tablet 40 milliGRAM(s) Oral daily  lactobacillus acidophilus 1 Tablet(s) Oral two times a day with meals  levothyroxine 88 MICROGram(s) Oral daily  losartan 12.5 milliGRAM(s) Oral daily  metoprolol succinate ER 25 milliGRAM(s) Oral daily  Nephro-jonas 1 Tablet(s) Oral daily    MEDICATIONS  (PRN):  acetaminophen   Tablet 650 milliGRAM(s) Oral every 6 hours PRN For Temp greater than 38 C (100.4 F)  dextrose Gel 1 Dose(s) Oral once PRN Blood Glucose LESS THAN 70 milliGRAM(s)/deciliter  glucagon  Injectable 1 milliGRAM(s) IntraMuscular once PRN Glucose LESS THAN 70 milligrams/deciliter      Allergies    No Known Allergies    Intolerances        Vital Signs Last 24 Hrs  T(C): 36.8 (07 Oct 2017 04:55), Max: 36.9 (06 Oct 2017 20:01)  T(F): 98.2 (07 Oct 2017 04:55), Max: 98.4 (06 Oct 2017 20:01)  HR: 78 (07 Oct 2017 05:48) (78 - 81)  BP: 108/58 (07 Oct 2017 05:48) (102/63 - 108/58)  BP(mean): --  RR: 16 (07 Oct 2017 04:55) (16 - 16)  SpO2: 96% (07 Oct 2017 04:55) (96% - 100%)    I&O's Summary    06 Oct 2017 07:01  -  07 Oct 2017 07:00  --------------------------------------------------------  IN: 0 mL / OUT: 0 mL / NET: 0 mL        ON EXAM:    Appearance: [ ] Normal  [ ] abnormal [x ] NAD  [x]  obese  Eyes: [ x] PERRL [ ] EOMI  HENT: [ x] Normal [ ] Abnormal oral mucosa [ ]NC/AT  Cardiovascular: [ x] S1 [x ] S2 [x ] RRR [x ] m/r/g [x ]edema [ ] JVP  Procedural Access Site: [ ]  hematoma [ ] tender to palpation [ ] 2+ pulse [ ] bruit [ ] Ecchymosis  Respiratory: [x] Clear to auscultation bilaterally, no crackles wheezing or rhonchi  Gastrointestinal: [ x] Soft [ ] tenderness[ ] distension [ x] BS  Musculoskeletal: [ ] clubbing [ ] joint deformity   Neurologic: [ ] Non-focal  Lymphatic: [ ] lymphadenopathy  Psychiatry: [x ] AAOx3  [ ] confused [ ] disoriented [x ] Mood & affect appropriate  Skin: [ ]  rashes [ ] ecchymoses [ ] cyanosis    LABS: All Labs Reviewed:                        9.9    8.1   )-----------( 187      ( 07 Oct 2017 09:38 )             32.0                         9.0    7.3   )-----------( 188      ( 05 Oct 2017 08:00 )             30.8     07 Oct 2017 09:38    139    |  101    |  41     ----------------------------<  185    3.9     |  27     |  3.00   05 Oct 2017 08:00    143    |  108    |  31     ----------------------------<  157    4.2     |  26     |  2.70     Ca    8.9        07 Oct 2017 09:38  Ca    8.3        05 Oct 2017 08:00  Phos  2.7       06 Oct 2017 09:30            Blood Culture: Organism --  Gram Stain Blood -- Gram Stain --  Specimen Source .Blood Blood-Peripheral  Culture-Blood --    Organism Escherichia coli  Gram Stain Blood -- Gram Stain --  Specimen Source .Urine Clean Catch (Midstream)  Culture-Blood --

## 2017-10-07 NOTE — PROGRESS NOTE ADULT - SUBJECTIVE AND OBJECTIVE BOX
Patient is a 85y Female with past medical history of           presenting with        who reports no complaints overnight.    REVIEW OF SYSTEMS:    CONSTITUTIONAL: No  fevers or chills    Allergies    No Known Allergies    Intolerances        MEDICATIONS  (STANDING):  ascorbic acid 500 milliGRAM(s) Oral daily  aspirin  chewable 81 milliGRAM(s) Oral daily  atorvastatin 10 milliGRAM(s) Oral at bedtime  calcitriol   Capsule 0.25 MICROGram(s) Oral every 48 hours  calcium carbonate 500 mG (Tums) Chewable 1 Tablet(s) Chew three times a day  cefuroxime   Tablet 250 milliGRAM(s) Oral every 12 hours  dextrose 50% Injectable 12.5 Gram(s) IV Push once  dextrose 50% Injectable 25 Gram(s) IV Push once  dextrose 50% Injectable 25 Gram(s) IV Push once  docusate sodium 100 milliGRAM(s) Oral two times a day  enoxaparin Injectable 30 milliGRAM(s) SubCutaneous daily  epoetin kori Injectable 11584 Unit(s) SubCutaneous <User Schedule>  ferrous    sulfate 325 milliGRAM(s) Oral daily  folic acid 1 milliGRAM(s) Oral daily  furosemide    Tablet 40 milliGRAM(s) Oral daily  lactobacillus acidophilus 1 Tablet(s) Oral two times a day with meals  levothyroxine 88 MICROGram(s) Oral daily  losartan 12.5 milliGRAM(s) Oral daily  metoprolol succinate ER 25 milliGRAM(s) Oral daily  Nephro-jonas 1 Tablet(s) Oral daily      Vitals:  T(F): 98.2 (10-07-17 @ 04:55), Max: 98.4 (10-06-17 @ 20:01)  HR: 78 (10-07-17 @ 05:48)  BP: 108/58 (10-07-17 @ 05:48)  BP(mean): --  RR: 16 (10-07-17 @ 04:55)  SpO2: 96% (10-07-17 @ 04:55)  Wt(kg): --    I and O's:    10-06 @ 07:01  -  10-07 @ 07:00  --------------------------------------------------------  IN: 0 mL / OUT: 0 mL / NET: 0 mL            PHYSICAL EXAM:    Constitutional: NAD,   Neck: No JVD  Respiratory: CTAB  Cardiovascular: S1 and S2  Gastrointestinal: BS+, soft, NT/ND  Extremities: No peripheral edema    LABS:                        9.9    8.1   )-----------( 187      ( 07 Oct 2017 09:38 )             32.0       139    |  101    |  41     ----------------------------<  185       07 Oct 2017 09:38  3.9     |  27     |  3.00     143    |  108    |  31     ----------------------------<  157       05 Oct 2017 08:00  4.2     |  26     |  2.70     146    |  113    |  27     ----------------------------<  146       04 Oct 2017 07:47  4.7     |  24     |  2.40     Ca    8.9        07 Oct 2017 09:38  Ca    8.3        05 Oct 2017 08:00    Phos  2.7       06 Oct 2017 09:30          Serum Osmo:   Serum Uric Acid:   PERRI:   Double Stranded DNA:   C3:   C3 Nephritic Factor:   C4:   p-ANCA:   c-ANCA:   Anti-GBM:   ODIN-Smith Antibody:   Immunofixation:   [place]/Lambda Free Light Chain:   SPEP:   Hepatitis Panel:   HIV-1/2:     Urine Studies:      Urine chemistry:   Urine Na:   Urine Creatinine:   Urine Protein/Cr ratio:  Urine K:   Urine Osm:   24 Hr urine studies:     24 hr urine Creatinine Clearance:    RADIOLOGY & ADDITIONAL STUDIES:

## 2017-10-07 NOTE — PROGRESS NOTE ADULT - ASSESSMENT
85 F w hx of CAD s/p CABG, HTN, DM, HLD, mild ICM, severe MR, hypothyroid, PPM presents for near syncope/AMS.    - TTE reviewed, poor study. EF 50% with mod-severe MR.  - her volume status is much improved and she is generally feeling better.  - s/p IV lasix for the last few days. I have restarted her home dose of Lasix 40 po daily  - Monitor strict I & O and daily weights  - Monitor renal function; Losartan reintroduced by Renal. Creatinine generally unchanged.  - Doppler of BLE negative of DVT  - Cont current DM meds.  - Abx for UTI per primary  - Follow up echo  - PT does not want to pursue a mitral clip  - [employer] Rep called for interrogation completed no AF noted or events noted.   - Toprol XL 25  - Monitor and replete electrolytes. Keep K>4.0 and Mg>2.0.  - Further cardiac workup will depend on clinical course.  - d/c planning to rehab

## 2017-10-08 LAB
CULTURE RESULTS: SIGNIFICANT CHANGE UP
CULTURE RESULTS: SIGNIFICANT CHANGE UP
SPECIMEN SOURCE: SIGNIFICANT CHANGE UP
SPECIMEN SOURCE: SIGNIFICANT CHANGE UP

## 2017-10-08 PROCEDURE — 99233 SBSQ HOSP IP/OBS HIGH 50: CPT

## 2017-10-08 RX ADMIN — Medication 1 TABLET(S): at 05:31

## 2017-10-08 RX ADMIN — Medication 500 MILLIGRAM(S): at 11:08

## 2017-10-08 RX ADMIN — Medication 100 MILLIGRAM(S): at 05:31

## 2017-10-08 RX ADMIN — Medication 88 MICROGRAM(S): at 05:31

## 2017-10-08 RX ADMIN — Medication 1 TABLET(S): at 11:08

## 2017-10-08 RX ADMIN — Medication 100 MILLIGRAM(S): at 17:33

## 2017-10-08 RX ADMIN — ENOXAPARIN SODIUM 30 MILLIGRAM(S): 100 INJECTION SUBCUTANEOUS at 11:10

## 2017-10-08 RX ADMIN — Medication 325 MILLIGRAM(S): at 11:08

## 2017-10-08 RX ADMIN — Medication 1 TABLET(S): at 17:33

## 2017-10-08 RX ADMIN — Medication 1 MILLIGRAM(S): at 11:08

## 2017-10-08 RX ADMIN — CALCITRIOL 0.25 MICROGRAM(S): 0.5 CAPSULE ORAL at 17:33

## 2017-10-08 RX ADMIN — Medication 250 MILLIGRAM(S): at 05:31

## 2017-10-08 RX ADMIN — Medication 81 MILLIGRAM(S): at 11:08

## 2017-10-08 RX ADMIN — Medication 250 MILLIGRAM(S): at 17:33

## 2017-10-08 RX ADMIN — ATORVASTATIN CALCIUM 10 MILLIGRAM(S): 80 TABLET, FILM COATED ORAL at 21:36

## 2017-10-08 RX ADMIN — Medication 1 TABLET(S): at 08:09

## 2017-10-08 RX ADMIN — Medication 1 TABLET(S): at 21:36

## 2017-10-08 NOTE — PROGRESS NOTE ADULT - SUBJECTIVE AND OBJECTIVE BOX
Patient is a 85y Female with past medical history of           presenting with        who reports no complaints overnight.    REVIEW OF SYSTEMS:    CONSTITUTIONAL: No  fevers or chills    Allergies    No Known Allergies    Intolerances        MEDICATIONS  (STANDING):  ascorbic acid 500 milliGRAM(s) Oral daily  aspirin  chewable 81 milliGRAM(s) Oral daily  atorvastatin 10 milliGRAM(s) Oral at bedtime  calcitriol   Capsule 0.25 MICROGram(s) Oral every 48 hours  calcium carbonate 500 mG (Tums) Chewable 1 Tablet(s) Chew three times a day  cefuroxime   Tablet 250 milliGRAM(s) Oral every 12 hours  dextrose 50% Injectable 12.5 Gram(s) IV Push once  dextrose 50% Injectable 25 Gram(s) IV Push once  dextrose 50% Injectable 25 Gram(s) IV Push once  docusate sodium 100 milliGRAM(s) Oral two times a day  enoxaparin Injectable 30 milliGRAM(s) SubCutaneous daily  epoetin kori Injectable 45678 Unit(s) SubCutaneous <User Schedule>  ferrous    sulfate 325 milliGRAM(s) Oral daily  folic acid 1 milliGRAM(s) Oral daily  lactobacillus acidophilus 1 Tablet(s) Oral two times a day with meals  levothyroxine 88 MICROGram(s) Oral daily  metoprolol succinate ER 25 milliGRAM(s) Oral daily  Nephro-jonas 1 Tablet(s) Oral daily      Vitals:  T(F): 98.1 (10-08-17 @ 04:52), Max: 98.8 (10-07-17 @ 20:45)  HR: 83 (10-08-17 @ 04:52)  BP: 107/67 (10-08-17 @ 04:52)  BP(mean): --  RR: 16 (10-08-17 @ 04:52)  SpO2: 96% (10-08-17 @ 04:52)  Wt(kg): --    I and O's:    10-07 @ 07:01  -  10-08 @ 07:00  --------------------------------------------------------  IN: 120 mL / OUT: 0 mL / NET: 120 mL            PHYSICAL EXAM:    Constitutional: NAD,   Neck: No JVD  Respiratory: CTAB  Cardiovascular: S1 and S2  Gastrointestinal: BS+, soft, NT/ND  Extremities: No peripheral edema  Neurological: no focal deficits    LABS:                        9.9    8.1   )-----------( 187      ( 07 Oct 2017 09:38 )             32.0       139    |  101    |  41     ----------------------------<  185       07 Oct 2017 09:38  3.9     |  27     |  3.00     143    |  108    |  31     ----------------------------<  157       05 Oct 2017 08:00  4.2     |  26     |  2.70     Ca    8.9        07 Oct 2017 09:38  Ca    8.3        05 Oct 2017 08:00    Phos  2.7       06 Oct 2017 09:30          Serum Osmo:   Serum Uric Acid:   PERRI:   Double Stranded DNA:   C3:   C3 Nephritic Factor:   C4:   p-ANCA:   c-ANCA:   Anti-GBM:   ODIN-Smith Antibody:   Immunofixation:   [place]/Lambda Free Light Chain:   SPEP:   Hepatitis Panel:   HIV-1/2:     Urine Studies:      Urine chemistry:   Urine Na:   Urine Creatinine:   Urine Protein/Cr ratio:  Urine K:   Urine Osm:   24 Hr urine studies:     24 hr urine Creatinine Clearance:    RADIOLOGY & ADDITIONAL STUDIES:

## 2017-10-08 NOTE — PROGRESS NOTE ADULT - ASSESSMENT
85 F w hx of CAD s/p CABG, HTN, DM, HLD, mild ICM, severe MR, hypothyroid, PPM presents for near syncope/AMS.    - TTE reviewed, poor study. EF 50% with mod-severe MR.  - her volume status is much improved and she is generally feeling better.  - s/p IV lasix for the last few days. She is euvolemic on exam. Her Lasix has been stopped because of TITA.  - Watch volume status closely. She should be on a maintenance dose of Lasix at 40 mg po daily. If creatinine improves tomorrow, I would d/c her on Lasix 40 po daily.  - Monitor strict I & O and daily weights  - Monitor renal function; Losartan reintroduced by Renal and creatinine is increasing so it has been stopped.  - Doppler of BLE negative of DVT  - Cont current DM meds.  - Abx for UTI per primary  - PT does not want to pursue a mitral clip  - Toprol XL 25, BP is at goal  - Monitor and replete electrolytes. Keep K>4.0 and Mg>2.0.  - Further cardiac workup will depend on clinical course.  - d/c planning to rehab

## 2017-10-08 NOTE — PROGRESS NOTE ADULT - SUBJECTIVE AND OBJECTIVE BOX
[facility] - Re Lowery, John Bradford, Karen, Gopi Rooney  Office Number:  [phone]    Patient resting comfortably in bed in NAD.  Laying flat with no respiratory distress.  No complaints of chest pain, dyspnea, palpitations, PND, or orthopnea.  Feeling generally well.    ROS: negative unless otherwise mentioned.    Telemetry:  Not on tele    MEDICATIONS  (STANDING):  ascorbic acid 500 milliGRAM(s) Oral daily  aspirin  chewable 81 milliGRAM(s) Oral daily  atorvastatin 10 milliGRAM(s) Oral at bedtime  calcitriol   Capsule 0.25 MICROGram(s) Oral every 48 hours  calcium carbonate 500 mG (Tums) Chewable 1 Tablet(s) Chew three times a day  cefuroxime   Tablet 250 milliGRAM(s) Oral every 12 hours  dextrose 50% Injectable 12.5 Gram(s) IV Push once  dextrose 50% Injectable 25 Gram(s) IV Push once  dextrose 50% Injectable 25 Gram(s) IV Push once  docusate sodium 100 milliGRAM(s) Oral two times a day  enoxaparin Injectable 30 milliGRAM(s) SubCutaneous daily  epoetin kori Injectable 03412 Unit(s) SubCutaneous <User Schedule>  ferrous    sulfate 325 milliGRAM(s) Oral daily  folic acid 1 milliGRAM(s) Oral daily  lactobacillus acidophilus 1 Tablet(s) Oral two times a day with meals  levothyroxine 88 MICROGram(s) Oral daily  metoprolol succinate ER 25 milliGRAM(s) Oral daily  Nephro-jonas 1 Tablet(s) Oral daily    MEDICATIONS  (PRN):  acetaminophen   Tablet 650 milliGRAM(s) Oral every 6 hours PRN For Temp greater than 38 C (100.4 F)  dextrose Gel 1 Dose(s) Oral once PRN Blood Glucose LESS THAN 70 milliGRAM(s)/deciliter  glucagon  Injectable 1 milliGRAM(s) IntraMuscular once PRN Glucose LESS THAN 70 milligrams/deciliter      Allergies    No Known Allergies    Intolerances        Vital Signs Last 24 Hrs  T(C): 36.7 (08 Oct 2017 04:52), Max: 37.1 (07 Oct 2017 20:45)  T(F): 98.1 (08 Oct 2017 04:52), Max: 98.8 (07 Oct 2017 20:45)  HR: 83 (08 Oct 2017 04:52) (66 - 93)  BP: 107/67 (08 Oct 2017 04:52) (107/67 - 126/62)  BP(mean): --  RR: 16 (08 Oct 2017 04:52) (16 - 16)  SpO2: 96% (08 Oct 2017 04:52) (96% - 98%)    I&O's Summary    07 Oct 2017 07:01  -  08 Oct 2017 07:00  --------------------------------------------------------  IN: 120 mL / OUT: 0 mL / NET: 120 mL        ON EXAM:    Appearance: [ ] Normal  [ ] abnormal [x ] NAD  [x]  obese  Eyes: [ x] PERRL [ ] EOMI  HENT: [ x] Normal [ ] Abnormal oral mucosa [ ]NC/AT  Cardiovascular: [ x] S1 [x ] S2 [x ] RRR [x ] m/r/g [x ]edema [ ] JVP; + SM at apex, LUSB  Procedural Access Site: [ ]  hematoma [ ] tender to palpation [ ] 2+ pulse [ ] bruit [ ] Ecchymosis  Respiratory: [x] Clear to auscultation bilaterally, no crackles wheezing or rhonchi  Gastrointestinal: [ x] Soft [ ] tenderness[ ] distension [ x] BS  Musculoskeletal: [ ] clubbing [ ] joint deformity   Neurologic: [x ] Non-focal  Lymphatic: [ ] lymphadenopathy  Psychiatry: [x ] AAOx3  [ ] confused [ ] disoriented [x ] Mood & affect appropriate  Skin: [ ]  rashes [ ] ecchymoses [ ] cyanosis    LABS: All Labs Reviewed:                        9.9    8.1   )-----------( 187      ( 07 Oct 2017 09:38 )             32.0     07 Oct 2017 09:38    139    |  101    |  41     ----------------------------<  185    3.9     |  27     |  3.00     Ca    8.9        07 Oct 2017 09:38  Phos  2.7       06 Oct 2017 09:30            Blood Culture: Organism --  Gram Stain Blood -- Gram Stain --  Specimen Source .Blood Blood-Peripheral  Culture-Blood --    Organism Escherichia coli  Gram Stain Blood -- Gram Stain --  Specimen Source .Urine Clean Catch (Midstream)  Culture-Blood --

## 2017-10-08 NOTE — PROGRESS NOTE ADULT - SUBJECTIVE AND OBJECTIVE BOX
PCP  Subjective:   in bed awake , responsive     Objective:   T(F): 98.1 (10-08-17 @ 04:52), Max: 98.8 (10-07-17 @ 20:45)  HR: 83 (10-08-17 @ 04:52) (66 - 93)  BP: 107/67 (10-08-17 @ 04:52) (96/60 - 127/65)  RR: 16 (10-08-17 @ 04:52) (15 - 17)  SpO2: 96% (10-08-17 @ 04:52) (92% - 100%)  Wt(kg): --  Daily     Daily Weight in k.3 (08 Oct 2017 04:52)    GENERAL:  wdwn f , alert   EYES: eomi  NECK: supple  CHEST/LUNG: clear  HEART: s1 s2 regular   ABDOMEN: soft nt + bs  EXTREMITIES:  no edema   SKIN: warm   CNS: awake , responsive non focal     Allergies: Allergies    No Known Allergies    Intolerances        Medications:   acetaminophen   Tablet 650 milliGRAM(s) Oral every 6 hours PRN  ascorbic acid 500 milliGRAM(s) Oral daily  aspirin  chewable 81 milliGRAM(s) Oral daily  atorvastatin 10 milliGRAM(s) Oral at bedtime  calcitriol   Capsule 0.25 MICROGram(s) Oral every 48 hours  calcium carbonate 500 mG (Tums) Chewable 1 Tablet(s) Chew three times a day  cefuroxime   Tablet 250 milliGRAM(s) Oral every 12 hours  dextrose 50% Injectable 12.5 Gram(s) IV Push once  dextrose 50% Injectable 25 Gram(s) IV Push once  dextrose 50% Injectable 25 Gram(s) IV Push once  dextrose Gel 1 Dose(s) Oral once PRN  docusate sodium 100 milliGRAM(s) Oral two times a day  enoxaparin Injectable 30 milliGRAM(s) SubCutaneous daily  epoetin kori Injectable 46240 Unit(s) SubCutaneous <User Schedule>  ferrous    sulfate 325 milliGRAM(s) Oral daily  folic acid 1 milliGRAM(s) Oral daily  glucagon  Injectable 1 milliGRAM(s) IntraMuscular once PRN  lactobacillus acidophilus 1 Tablet(s) Oral two times a day with meals  levothyroxine 88 MICROGram(s) Oral daily  metoprolol succinate ER 25 milliGRAM(s) Oral daily  Nephro-jonas 1 Tablet(s) Oral daily      LABS:                        9.9    8.1   )-----------( 187      ( 07 Oct 2017 09:38 )             32.0     10-07    139  |  101  |  41<H>  ----------------------------<  185<H>  3.9   |  27  |  3.00<H>    Ca    8.9      07 Oct 2017 09:38              CAPILLARY BLOOD GLUCOSE  273 (07 Oct 2017 16:56)  242 (07 Oct 2017 12:14)                RECENT CULTURES:  Culture Results:   No growth to date. (10-03 @ 18:20)  Culture Results:   No growth to date. (10-03 @ 18:20)  Culture Results:   >100,000 CFU/ml Escherichia coli (10-03 @ 18:11)

## 2017-10-08 NOTE — PROGRESS NOTE ADULT - ASSESSMENT
86 yo f ,  , lives with dtr , in her own apartment , former smoker , hx of diabetes mellitus type 2 , osteoarthritis , hypertension , osteoporosis , obesity , cabg , ppm, and severe MR   patient has had an episode of hypoglycemia last Sunday ems called to house and was given glucagon , then today confused , and with change in mental status , and with low fs glucometer test and brought to er , given d50 and recovered  , later in er alert and responsive but fuggy ,   she takes 25 units Lantus bid , has not been by md in months , has difficulty ambulating , no n/v , no cp , no sob , no head aches , no rash , no fever , no chills , no dysuria ,   in er also abnl gfr , and anemia ,   on 10/4/2017 patient stable ,   blood sugar better ,   TITA on ckd , nephro to follow up , hydrate , and ck renal sono ,   dm fs and coverage hold lantus for now ,  coronary artery disease and MR , cardio to eval resume bp meds and asa , get echo    dvt and gi prophylax , and get PT eval , consider rehab  ? uti on iv abx , f/u cultures ,   on 10/5/2017 feels better , dm noted , hg a1c low , will dc insulin , and fs , and uti on iv abx   ckd and tita on ckd nephro noted , , case rev with dtr , dvt gi prophylax , plan rehab   on  10/6/2017 doing better , plan rehab , uti on po abx ,   on 10/8/2017 patient over all stable , awaiting rehab , and bs elevating , will monitor , post uti ,   CKD being monitored by nephro , may need eventual dialysis ,   Mitral regurge , refusing clip for now ,

## 2017-10-09 LAB
ANION GAP SERPL CALC-SCNC: 9 MMOL/L — SIGNIFICANT CHANGE UP (ref 5–17)
BUN SERPL-MCNC: 52 MG/DL — HIGH (ref 7–23)
CALCIUM SERPL-MCNC: 10.8 MG/DL — HIGH (ref 8.5–10.1)
CHLORIDE SERPL-SCNC: 97 MMOL/L — SIGNIFICANT CHANGE UP (ref 96–108)
CO2 SERPL-SCNC: 31 MMOL/L — SIGNIFICANT CHANGE UP (ref 22–31)
CREAT SERPL-MCNC: 3.1 MG/DL — HIGH (ref 0.5–1.3)
GLUCOSE SERPL-MCNC: 251 MG/DL — HIGH (ref 70–99)
HCT VFR BLD CALC: 33.6 % — LOW (ref 34.5–45)
HGB BLD-MCNC: 10.2 G/DL — LOW (ref 11.5–15.5)
MCHC RBC-ENTMCNC: 29.7 PG — SIGNIFICANT CHANGE UP (ref 27–34)
MCHC RBC-ENTMCNC: 30.4 GM/DL — LOW (ref 32–36)
MCV RBC AUTO: 97.6 FL — SIGNIFICANT CHANGE UP (ref 80–100)
PLATELET # BLD AUTO: 187 K/UL — SIGNIFICANT CHANGE UP (ref 150–400)
POTASSIUM SERPL-MCNC: 3.8 MMOL/L — SIGNIFICANT CHANGE UP (ref 3.5–5.3)
POTASSIUM SERPL-SCNC: 3.8 MMOL/L — SIGNIFICANT CHANGE UP (ref 3.5–5.3)
RBC # BLD: 3.45 M/UL — LOW (ref 3.8–5.2)
RBC # FLD: 14.7 % — HIGH (ref 10.3–14.5)
SODIUM SERPL-SCNC: 137 MMOL/L — SIGNIFICANT CHANGE UP (ref 135–145)
WBC # BLD: 9.7 K/UL — SIGNIFICANT CHANGE UP (ref 3.8–10.5)
WBC # FLD AUTO: 9.7 K/UL — SIGNIFICANT CHANGE UP (ref 3.8–10.5)

## 2017-10-09 PROCEDURE — 99233 SBSQ HOSP IP/OBS HIGH 50: CPT

## 2017-10-09 RX ORDER — DEXTROSE 50 % IN WATER 50 %
12.5 SYRINGE (ML) INTRAVENOUS ONCE
Qty: 0 | Refills: 0 | Status: DISCONTINUED | OUTPATIENT
Start: 2017-10-09 | End: 2017-10-10

## 2017-10-09 RX ORDER — DEXTROSE 50 % IN WATER 50 %
25 SYRINGE (ML) INTRAVENOUS ONCE
Qty: 0 | Refills: 0 | Status: DISCONTINUED | OUTPATIENT
Start: 2017-10-09 | End: 2017-10-10

## 2017-10-09 RX ORDER — SODIUM CHLORIDE 9 MG/ML
1000 INJECTION, SOLUTION INTRAVENOUS
Qty: 0 | Refills: 0 | Status: DISCONTINUED | OUTPATIENT
Start: 2017-10-09 | End: 2017-10-10

## 2017-10-09 RX ORDER — INSULIN LISPRO 100/ML
VIAL (ML) SUBCUTANEOUS
Qty: 0 | Refills: 0 | Status: DISCONTINUED | OUTPATIENT
Start: 2017-10-09 | End: 2017-10-10

## 2017-10-09 RX ORDER — GLUCAGON INJECTION, SOLUTION 0.5 MG/.1ML
1 INJECTION, SOLUTION SUBCUTANEOUS ONCE
Qty: 0 | Refills: 0 | Status: DISCONTINUED | OUTPATIENT
Start: 2017-10-09 | End: 2017-10-10

## 2017-10-09 RX ORDER — ONDANSETRON 8 MG/1
4 TABLET, FILM COATED ORAL ONCE
Qty: 0 | Refills: 0 | Status: COMPLETED | OUTPATIENT
Start: 2017-10-09 | End: 2017-10-09

## 2017-10-09 RX ORDER — DEXTROSE 50 % IN WATER 50 %
1 SYRINGE (ML) INTRAVENOUS ONCE
Qty: 0 | Refills: 0 | Status: DISCONTINUED | OUTPATIENT
Start: 2017-10-09 | End: 2017-10-10

## 2017-10-09 RX ADMIN — Medication 250 MILLIGRAM(S): at 17:35

## 2017-10-09 RX ADMIN — Medication 1 TABLET(S): at 17:35

## 2017-10-09 RX ADMIN — Medication 1 TABLET(S): at 11:08

## 2017-10-09 RX ADMIN — Medication 81 MILLIGRAM(S): at 11:08

## 2017-10-09 RX ADMIN — Medication 1 TABLET(S): at 13:08

## 2017-10-09 RX ADMIN — SODIUM CHLORIDE 50 MILLILITER(S): 9 INJECTION, SOLUTION INTRAVENOUS at 16:07

## 2017-10-09 RX ADMIN — Medication 1 TABLET(S): at 21:38

## 2017-10-09 RX ADMIN — Medication 100 MILLIGRAM(S): at 05:47

## 2017-10-09 RX ADMIN — Medication 1 MILLIGRAM(S): at 11:08

## 2017-10-09 RX ADMIN — Medication 1 TABLET(S): at 07:48

## 2017-10-09 RX ADMIN — Medication 3: at 17:34

## 2017-10-09 RX ADMIN — ONDANSETRON 4 MILLIGRAM(S): 8 TABLET, FILM COATED ORAL at 20:49

## 2017-10-09 RX ADMIN — ENOXAPARIN SODIUM 30 MILLIGRAM(S): 100 INJECTION SUBCUTANEOUS at 12:15

## 2017-10-09 RX ADMIN — Medication 500 MILLIGRAM(S): at 11:08

## 2017-10-09 RX ADMIN — Medication 100 MILLIGRAM(S): at 17:35

## 2017-10-09 RX ADMIN — Medication 325 MILLIGRAM(S): at 11:08

## 2017-10-09 RX ADMIN — ATORVASTATIN CALCIUM 10 MILLIGRAM(S): 80 TABLET, FILM COATED ORAL at 21:37

## 2017-10-09 RX ADMIN — Medication 1 TABLET(S): at 05:47

## 2017-10-09 RX ADMIN — Medication 250 MILLIGRAM(S): at 05:46

## 2017-10-09 RX ADMIN — Medication 88 MICROGRAM(S): at 05:47

## 2017-10-09 NOTE — PROGRESS NOTE ADULT - SUBJECTIVE AND OBJECTIVE BOX
Patient is a 85y Female with past medical history of           presenting with        who reports no complaints overnight.    REVIEW OF SYSTEMS:    CONSTITUTIONAL: No weakness, fevers or chills    Allergies    No Known Allergies    Intolerances        MEDICATIONS  (STANDING):  ascorbic acid 500 milliGRAM(s) Oral daily  aspirin  chewable 81 milliGRAM(s) Oral daily  atorvastatin 10 milliGRAM(s) Oral at bedtime  calcium carbonate 500 mG (Tums) Chewable 1 Tablet(s) Chew three times a day  cefuroxime   Tablet 250 milliGRAM(s) Oral every 12 hours  dextrose 50% Injectable 12.5 Gram(s) IV Push once  dextrose 50% Injectable 25 Gram(s) IV Push once  dextrose 50% Injectable 25 Gram(s) IV Push once  docusate sodium 100 milliGRAM(s) Oral two times a day  enoxaparin Injectable 30 milliGRAM(s) SubCutaneous daily  epoetin kori Injectable 98512 Unit(s) SubCutaneous <User Schedule>  ferrous    sulfate 325 milliGRAM(s) Oral daily  folic acid 1 milliGRAM(s) Oral daily  lactobacillus acidophilus 1 Tablet(s) Oral two times a day with meals  levothyroxine 88 MICROGram(s) Oral daily  metoprolol succinate ER 25 milliGRAM(s) Oral daily  Nephro-jonas 1 Tablet(s) Oral daily  sodium chloride 0.45%. 1000 milliLiter(s) (50 mL/Hr) IV Continuous <Continuous>      Vitals:  T(F): 98.2 (10-09-17 @ 14:39), Max: 98.4 (10-08-17 @ 21:38)  HR: 91 (10-09-17 @ 14:39)  BP: 112/61 (10-09-17 @ 14:39)  BP(mean): --  RR: 15 (10-09-17 @ 14:39)  SpO2: 97% (10-09-17 @ 14:39)  Wt(kg): --    I and O's:    10-08 @ 07:01  -  10-09 @ 07:00  --------------------------------------------------------  IN: 0 mL / OUT: 200 mL / NET: -200 mL            PHYSICAL EXAM:    Constitutional: NAD,   Neck: No JVD  Respiratory: CTAB  Cardiovascular: S1 and S2  Gastrointestinal: BS+, soft, NT/ND  Extremities: No peripheral edema    LABS:                        10.2   9.7   )-----------( 187      ( 09 Oct 2017 08:32 )             33.6       137    |  97     |  52     ----------------------------<  251       09 Oct 2017 08:32  3.8     |  31     |  3.10     139    |  101    |  41     ----------------------------<  185       07 Oct 2017 09:38  3.9     |  27     |  3.00     Ca    10.8       09 Oct 2017 08:32  Ca    8.9        07 Oct 2017 09:38    Phos  2.7       06 Oct 2017 09:30          Serum Osmo:   Serum Uric Acid:   PERRI:   Double Stranded DNA:   C3:   C3 Nephritic Factor:   C4:   p-ANCA:   c-ANCA:   Anti-GBM:   ODIN-Smith Antibody:   Immunofixation:   [place]/Lambda Free [employer] Chain:   SPEP:   Hepatitis Panel:   HIV-1/2:     Urine Studies:      Urine chemistry:   Urine Na:   Urine Creatinine:   Urine Protein/Cr ratio:  Urine K:   Urine Osm:   24 Hr urine studies:     24 hr urine Creatinine Clearance:    RADIOLOGY & ADDITIONAL STUDIES:

## 2017-10-09 NOTE — PROGRESS NOTE ADULT - SUBJECTIVE AND OBJECTIVE BOX
HPI:  86 yo f ,  , lives with dtr , in her own apartment , former smoker , hx of diabetes mellitus type 2 , osteoarthritis , hypertension , osteoporosis , obesity , cabg , ppm, and severe MR  patient has had an episode of hypoglycemia last  ems called to house and was given glucagon and recovered ,  , then today confused , and with change in mental status ,no syncope , and with low fs glucometer test and brought to er , given d50 and  , later in er alert and responsive but fuggy , eating some cookies ,   she takes 25 units Lantus bid , has not been by md in months , has difficulty ambulating , no n/v , no cp , no sob , no head aches , no rash , no fever , no chills , no dysuria , (03 Oct 2017 23:55)      SUBJECTIVE:  Patient is a 85y old  Female who presents with a chief complaint of hypoglycemia , change in mental status (04 Oct 2017 15:49)          OBJECTIVE:  Review Of Systems:  Constitutional: [ ] Fever [ ] Chills [ ] Fatigue [ ] Weight change   HEENT: [ ] Blurred vision [ ] Eye Pain [ ] Headache [ ] Runny nose [ ] Sore Throat   Respiratory: [ ] Cough [ ] Wheezing [ ] Shortness of breath  Cardiovascular: [ ] Chest Pain [ ] Palpitations [ ] CHILDERS [ ] PND [ ] Orthopnea  Gastrointestinal: [ ] Abdominal Pain [ ] Diarrhea [ ] Constipation [ ] Hemorrhoids [ ] Nausea [ ] Vomiting  Genitourinary: [ ] Nocturia [ ] Dysuria [ ] Incontinence  Extremities: [ ] Swelling [ ] Joint Pain  Neurologic: [ ] Focal deficit [ ] Paresthesias [ ] Syncope  Lymphatic: [ ] Swelling [ ] Lymphadenopathy   Skin: [ ] Rash [ ] Ecchymoses [ ] Wounds [ ] Lesions  Psychiatry: [ ] Depression [ ] Suicidal/Homicidal Ideation [ ] Anxiety [ ] Sleep Disturbances  [ ] 10 point review of systems is otherwise negative except as mentioned above            [ ]Unable to obtain    Allergy:  Allergies    No Known Allergies    Intolerances        Medications:  MEDICATIONS  (STANDING):  ascorbic acid 500 milliGRAM(s) Oral daily  aspirin  chewable 81 milliGRAM(s) Oral daily  atorvastatin 10 milliGRAM(s) Oral at bedtime  calcitriol   Capsule 0.25 MICROGram(s) Oral every 48 hours  calcium carbonate 500 mG (Tums) Chewable 1 Tablet(s) Chew three times a day  cefuroxime   Tablet 250 milliGRAM(s) Oral every 12 hours  dextrose 50% Injectable 12.5 Gram(s) IV Push once  dextrose 50% Injectable 25 Gram(s) IV Push once  dextrose 50% Injectable 25 Gram(s) IV Push once  docusate sodium 100 milliGRAM(s) Oral two times a day  enoxaparin Injectable 30 milliGRAM(s) SubCutaneous daily  epoetin kori Injectable 47182 Unit(s) SubCutaneous <User Schedule>  ferrous    sulfate 325 milliGRAM(s) Oral daily  folic acid 1 milliGRAM(s) Oral daily  lactobacillus acidophilus 1 Tablet(s) Oral two times a day with meals  levothyroxine 88 MICROGram(s) Oral daily  metoprolol succinate ER 25 milliGRAM(s) Oral daily  Nephro-jonas 1 Tablet(s) Oral daily    MEDICATIONS  (PRN):  acetaminophen   Tablet 650 milliGRAM(s) Oral every 6 hours PRN For Temp greater than 38 C (100.4 F)  dextrose Gel 1 Dose(s) Oral once PRN Blood Glucose LESS THAN 70 milliGRAM(s)/deciliter  glucagon  Injectable 1 milliGRAM(s) IntraMuscular once PRN Glucose LESS THAN 70 milligrams/deciliter      PMH/PSH/FH/SH: [ ] Unchanged    Vitals:  T(C): 36.9 (10-09-17 @ 04:48), Max: 36.9 (10-08-17 @ 21:38)  HR: 89 (10-09-17 @ 04:48) (75 - 89)  BP: 107/63 (10-09-17 @ 04:48) (105/61 - 126/62)  BP(mean): --  RR: 16 (10-09-17 @ 04:48) (15 - 16)  SpO2: 98% (10-09-17 @ 04:48) (96% - 99%)  Wt(kg): --  Daily     Daily Weight in k.3 (09 Oct 2017 04:48)  I&O's Summary    08 Oct 2017 07:01  -  09 Oct 2017 07:00  --------------------------------------------------------  IN: 0 mL / OUT: 200 mL / NET: -200 mL        Labs:                        9.9    8.1   )-----------( 187      ( 07 Oct 2017 09:38 )             32.0     10    139  |  101  |  41<H>  ----------------------------<  185<H>  3.9   |  27  |  3.00<H>    Ca    8.9      07 Oct 2017 09:38                        ECG:    Echo:  < from: TTE Echo Doppler w/o Cont (10.04.17 @ 14:19) >   EXAM:  ECHO TTE W/O CON COMP W/DOPPLR         PROCEDURE DATE:  10/04/2017        INTERPRETATION:  INDICATION: Mitral valve disease    Blood Pressure 110/61    Height 162     Weight 81       BSA 1.8    Dimensions:    LA 4.5       Normal Values: 2.0- 4.0 cm    Ao 2.9        Normal Values: 2.0 - 3.8 cm  SEPTUM 1.1       Normal Values: 0.6 - 1.2 cm  PWT 1.1       Normal Values: 0.6 - 1.1 cm  LVIDd 4.7         Normal Values: 3.0 - 5.6 cm  LVIDs 3.5         Normal Values: 1.8 - 4.0 cm    Derived Variables:  LVMI     g/m2  RWT      Fractional Short      Ejection Fraction 50    Doppler Peak v. AoV=   (m/sec)    OBSERVATIONS:    This is a technically limited study with suboptimal endocardial   definition. Within these limitations, the left ventricle has borderline   concentric hypertrophy with normal internal dimensions and systolic   function at the lower limits of normal, estimated LVEF of 50%. There is   abnormal septal activation, consistent with a paced rhythm. The right   ventricle is not well visualized. There is left atrial enlargement. The   mitral valve leaflets appear structurally normal. There is at least 3+   MR. Severe MR cannot be excluded. The aortic valve leaflets appear   calcified without clear stenosis. There is minimalAI. There is 2+ TR. A   device wire is identified within the right heart. Estimated PA systolic   pressure is 39 mmHg, assuming an RA pressure of 10 mmHg. No significant   pericardial effusion.          < end of copied text >    Stress Testing:     Cath:    Imaging:  < from: Xray Chest 1 View AP/PA (10.03.17 @ 13:40) >  EXAM:  CHEST 1 VIEW                            PROCEDURE DATE:  10/03/2017          INTERPRETATION:  Hypoglycemia.    AP chest. Prior 2013.    Low lung volumes. Left cardiac pacer reidentified in situ. Status post   median sternotomy. No change heart mediastinum. There is mild vascular   congestion bilateral interstitial edema trace right pleural effusion   consistent with fluid overload or mild/early CHF. Calcific bursitis left   shoulder.    Impression: As above                NIKOLAY CANNON M.D., ATTENDING RADIOLOGIST  This document has been electronically signed. Oct  3 2017  1:42PM        < end of copied text >  < from: US Duplex Venous Lower Ext Complete, Bilateral (10.04.17 @ 13:47) >  EXAM:  US DPLX LWR EXT VEINS COMPL BI                            PROCEDURE DATE:  10/04/2017          INTERPRETATION:  CLINICAL INFORMATION: Bilateral leg edema.    COMPARISON: None available.    TECHNIQUE: Duplex sonography of the BILATERAL LOWER extremities with   color and spectral Doppler, with and without compression.      FINDINGS:    There is normal compressibility of the bilateral common femoral, femoral   and popliteal veins. No calf vein thrombosis is detected.    Doppler examination shows normal spontaneous and phasic flow.    IMPRESSION:     No evidence of bilateral lower extremity deep venous thrombosis.                    XIN ROSSI M.D., ATTENDING RADIOLOGIST  This document has been electronically signed. Oct  4 2017  1:57PM    < end of copied text >  < from: US Renal (10.04.17 @ 13:47) >  EXAM:  US KIDNEY(S)                            PROCEDURE DATE:  10/04/2017          INTERPRETATION:  History: Hypertension, abnormal GFR.    Bilateral renal ultrasound.    Right kidney 8.1 left 8.3 cm long dimension. Relatively small bilateral   kidneys with cortical attrition may reflect chronic medical renal   disease. No hydronephrosis shadowing calculus or suspicious renal lesion.   There is a 1.9 cm simple cortical cyst right kidney and 2.7 cm simple   cortical cyst left kidney.  Bladder unremarkable.    Impression:    No post renal obstruction. Relatively small kidneys may reflect chronic   medical renal disease.    < end of copied text >    Interpretation of Telemetry:      Physical Exam:  Appearance: [ ] Normal  [ ] abnormal [ ] NAD   Eyes: [ ] PERRL [ ] EOMI  HENT: [ ] Normal [ ] Abnormal oral muscosa [ ]NC/AT  Cardiovascular: [ ] S1 [ ] S2 [ ] RRR [ ] m/r/g [ ]edema [ ] JVP  Procedural Access Site: [ ]  hematoma [ ] tender to palpation [ ] 2+ pulse [ ] bruit [ ] Ecchymosis  Respiratory: [ ] Clear to auscultation bilaterally  Gastrointestinal: [ ] Soft [ ] tenderness[ ] distension [ ] BS  Musculoskeletal: [ ] clubbing [ ] joint deformity   Neurologic: [ ] Non-focal  Lymphatic: [ ] lymphadenopathy  Psychiatry: [ ] AAOx3  [ ] confused [ ] disoriented [ ] Mood & affect appropriate  Skin: [ ]  rashes [ ] ecchymoses [ ] cyanosis HPI:  86 yo f ,  , lives with dtr , in her own apartment , former smoker , hx of diabetes mellitus type 2 , osteoarthritis , hypertension , osteoporosis , obesity , cabg , ppm, and severe MR  patient has had an episode of hypoglycemia last  ems called to house and was given glucagon and recovered ,  , then today confused , and with change in mental status ,no syncope , and with low fs glucometer test and brought to er , given d50 and  , later in er alert and responsive but fuggy , eating some cookies ,   she takes 25 units Lantus bid , has not been by md in months , has difficulty ambulating , no n/v , no cp , no sob , no head aches , no rash , no fever , no chills , no dysuria , (03 Oct 2017 23:55)      SUBJECTIVE:  Patient is a 85y old  Female who presents with a chief complaint of hypoglycemia , change in mental status (04 Oct 2017 15:49)          OBJECTIVE:  Review Of Systems:  Constitutional: [ ] Fever [ ] Chills [ ] Fatigue [ ] Weight change   HEENT: [ ] Blurred vision [ ] Eye Pain [ ] Headache [ ] Runny nose [ ] Sore Throat   Respiratory: [ ] Cough [ ] Wheezing [ ] Shortness of breath  Cardiovascular: [ ] Chest Pain [ ] Palpitations [ ] CHILDERS [ ] PND [ ] Orthopnea  Gastrointestinal: [ ] Abdominal Pain [ ] Diarrhea [ ] Constipation [ ] Hemorrhoids [ ] Nausea [ ] Vomiting  Genitourinary: [ ] Nocturia [ ] Dysuria [ ] Incontinence  Extremities: [ ] Swelling [ ] Joint Pain  Neurologic: [ ] Focal deficit [ ] Paresthesias [ ] Syncope  Lymphatic: [ ] Swelling [ ] Lymphadenopathy   Skin: [ ] Rash [ ] Ecchymoses [ ] Wounds [ ] Lesions  Psychiatry: [ ] Depression [ ] Suicidal/Homicidal Ideation [ ] Anxiety [ ] Sleep Disturbances  [x ] 10 point review of systems is otherwise negative except as mentioned above            [ ]Unable to obtain    Allergy:  Allergies    No Known Allergies    Intolerances        Medications:  MEDICATIONS  (STANDING):  ascorbic acid 500 milliGRAM(s) Oral daily  aspirin  chewable 81 milliGRAM(s) Oral daily  atorvastatin 10 milliGRAM(s) Oral at bedtime  calcitriol   Capsule 0.25 MICROGram(s) Oral every 48 hours  calcium carbonate 500 mG (Tums) Chewable 1 Tablet(s) Chew three times a day  cefuroxime   Tablet 250 milliGRAM(s) Oral every 12 hours  dextrose 50% Injectable 12.5 Gram(s) IV Push once  dextrose 50% Injectable 25 Gram(s) IV Push once  dextrose 50% Injectable 25 Gram(s) IV Push once  docusate sodium 100 milliGRAM(s) Oral two times a day  enoxaparin Injectable 30 milliGRAM(s) SubCutaneous daily  epoetin kori Injectable 89588 Unit(s) SubCutaneous <User Schedule>  ferrous    sulfate 325 milliGRAM(s) Oral daily  folic acid 1 milliGRAM(s) Oral daily  lactobacillus acidophilus 1 Tablet(s) Oral two times a day with meals  levothyroxine 88 MICROGram(s) Oral daily  metoprolol succinate ER 25 milliGRAM(s) Oral daily  Nephro-jonas 1 Tablet(s) Oral daily    MEDICATIONS  (PRN):  acetaminophen   Tablet 650 milliGRAM(s) Oral every 6 hours PRN For Temp greater than 38 C (100.4 F)  dextrose Gel 1 Dose(s) Oral once PRN Blood Glucose LESS THAN 70 milliGRAM(s)/deciliter  glucagon  Injectable 1 milliGRAM(s) IntraMuscular once PRN Glucose LESS THAN 70 milligrams/deciliter      PMH/PSH/FH/SH: [ ] Unchanged    Vitals:  T(C): 36.9 (10-09-17 @ 04:48), Max: 36.9 (10-08-17 @ 21:38)  HR: 89 (10-09-17 @ 04:48) (75 - 89)  BP: 107/63 (10-09-17 @ 04:48) (105/61 - 126/62)  BP(mean): --  RR: 16 (10-09-17 @ 04:48) (15 - 16)  SpO2: 98% (10-09-17 @ 04:48) (96% - 99%)  Wt(kg): --  Daily     Daily Weight in k.3 (09 Oct 2017 04:48)  I&O's Summary    08 Oct 2017 07:01  -  09 Oct 2017 07:00  --------------------------------------------------------  IN: 0 mL / OUT: 200 mL / NET: -200 mL        Labs:                        9.9    8.1   )-----------( 187      ( 07 Oct 2017 09:38 )             32.0     10    139  |  101  |  41<H>  ----------------------------<  185<H>  3.9   |  27  |  3.00<H>    Ca    8.9      07 Oct 2017 09:38                        ECG:    Echo:  < from: TTE Echo Doppler w/o Cont (10.04.17 @ 14:19) >   EXAM:  ECHO TTE W/O CON COMP W/DOPPLR         PROCEDURE DATE:  10/04/2017        INTERPRETATION:  INDICATION: Mitral valve disease    Blood Pressure 110/61    Height 162     Weight 81       BSA 1.8    Dimensions:    LA 4.5       Normal Values: 2.0- 4.0 cm    Ao 2.9        Normal Values: 2.0 - 3.8 cm  SEPTUM 1.1       Normal Values: 0.6 - 1.2 cm  PWT 1.1       Normal Values: 0.6 - 1.1 cm  LVIDd 4.7         Normal Values: 3.0 - 5.6 cm  LVIDs 3.5         Normal Values: 1.8 - 4.0 cm    Derived Variables:  LVMI     g/m2  RWT      Fractional Short      Ejection Fraction 50    Doppler Peak v. AoV=   (m/sec)    OBSERVATIONS:    This is a technically limited study with suboptimal endocardial   definition. Within these limitations, the left ventricle has borderline   concentric hypertrophy with normal internal dimensions and systolic   function at the lower limits of normal, estimated LVEF of 50%. There is   abnormal septal activation, consistent with a paced rhythm. The right   ventricle is not well visualized. There is left atrial enlargement. The   mitral valve leaflets appear structurally normal. There is at least 3+   MR. Severe MR cannot be excluded. The aortic valve leaflets appear   calcified without clear stenosis. There is minimalAI. There is 2+ TR. A   device wire is identified within the right heart. Estimated PA systolic   pressure is 39 mmHg, assuming an RA pressure of 10 mmHg. No significant   pericardial effusion.          < end of copied text >    Stress Testing:     Cath:    Imaging:  < from: Xray Chest 1 View AP/PA (10.03.17 @ 13:40) >  EXAM:  CHEST 1 VIEW                            PROCEDURE DATE:  10/03/2017          INTERPRETATION:  Hypoglycemia.    AP chest. Prior 2013.    Low lung volumes. Left cardiac pacer reidentified in situ. Status post   median sternotomy. No change heart mediastinum. There is mild vascular   congestion bilateral interstitial edema trace right pleural effusion   consistent with fluid overload or mild/early CHF. Calcific bursitis left   shoulder.    Impression: As above                NIKOLAY CANNON M.D., ATTENDING RADIOLOGIST  This document has been electronically signed. Oct  3 2017  1:42PM        < end of copied text >  < from: US Duplex Venous Lower Ext Complete, Bilateral (10.04.17 @ 13:47) >  EXAM:  US DPLX LWR EXT VEINS COMPL BI                            PROCEDURE DATE:  10/04/2017          INTERPRETATION:  CLINICAL INFORMATION: Bilateral leg edema.    COMPARISON: None available.    TECHNIQUE: Duplex sonography of the BILATERAL LOWER extremities with   color and spectral Doppler, with and without compression.      FINDINGS:    There is normal compressibility of the bilateral common femoral, femoral   and popliteal veins. No calf vein thrombosis is detected.    Doppler examination shows normal spontaneous and phasic flow.    IMPRESSION:     No evidence of bilateral lower extremity deep venous thrombosis.                    XIN ROSSI M.D., ATTENDING RADIOLOGIST  This document has been electronically signed. Oct  4 2017  1:57PM    < end of copied text >  < from: US Renal (10.04.17 @ 13:47) >  EXAM:  US KIDNEY(S)                            PROCEDURE DATE:  10/04/2017          INTERPRETATION:  History: Hypertension, abnormal GFR.    Bilateral renal ultrasound.    Right kidney 8.1 left 8.3 cm long dimension. Relatively small bilateral   kidneys with cortical attrition may reflect chronic medical renal   disease. No hydronephrosis shadowing calculus or suspicious renal lesion.   There is a 1.9 cm simple cortical cyst right kidney and 2.7 cm simple   cortical cyst left kidney.  Bladder unremarkable.    Impression:    No post renal obstruction. Relatively small kidneys may reflect chronic   medical renal disease.    < end of copied text >    Interpretation of Telemetry:      Physical Exam:  Appearance: [x ] Normal  [ ] abnormal x[ ] NAD   Eyes: [ x] PERRL [ x] EOMI  HENT: [x ] Normal [ ] Abnormal oral muscosa [x ]NC/AT  Cardiovascular: [x ] S1 [x ] S2 [x ] RRR [x ] m/r/g [ ]edema [ ] JVP  Procedural Access Site: [ ]  hematoma [ ] tender to palpation [ ] 2+ pulse [ ] bruit [ ] Ecchymosis  Respiratory: [x ] Clear to auscultation right lung field with basal rales left LL.  Gastrointestinal: [ x] Soft [ ] tenderness[ ] distension [ x] BS  Musculoskeletal: [ ] clubbing [ ] joint deformity   Neurologic: [ x] Non-focal  Lymphatic: [ ] lymphadenopathy  Psychiatry: [x ] AAOx3  [ ] confused [ ] disoriented [x ] Mood & affect appropriate  Skin: [ ]  rashes [x ] ecchymoses - right upper extremity (old) [ ] cyanosis

## 2017-10-09 NOTE — PROGRESS NOTE ADULT - ASSESSMENT
86 yo f ,  , lives with dtr , in her own apartment , former smoker , hx of diabetes mellitus type 2 , osteoarthritis , hypertension , osteoporosis , obesity , cabg , ppm, and severe MR   patient has had an episode of hypoglycemia last Sunday ems called to house and was given glucagon , then today confused , and with change in mental status , and with low fs glucometer test and brought to er , given d50 and recovered  , later in er alert and responsive but fuggy ,   she takes 25 units Lantus bid , has not been by md in months , has difficulty ambulating , no n/v , no cp , no sob , no head aches , no rash , no fever , no chills , no dysuria ,   in er also abnl gfr , and anemia ,   on 10/4/2017 patient stable ,   blood sugar better ,   TITA on ckd , nephro to follow up , hydrate , and ck renal sono ,   dm fs and coverage hold lantus for now ,  coronary artery disease and MR , cardio to eval resume bp meds and asa , get echo    dvt and gi prophylax , and get PT eval , consider rehab  ? uti on iv abx , f/u cultures ,   on 10/5/2017 feels better , dm noted , hg a1c low , will dc insulin , and fs , and uti on iv abx   ckd and tita on ckd nephro noted , , case rev with dtr , dvt gi prophylax , plan rehab   on  10/6/2017 doing better , plan rehab , uti on po abx ,   on 10/8/2017 patient over all stable , awaiting rehab , and bs elevating , will monitor , post uti ,   CKD being monitored by nephro , may need eventual dialysis ,   Mitral regurge , refusing clip for now ,   on 10/9/17 patient stable , plan rehab , on po abx , rest of conditions as above

## 2017-10-09 NOTE — PROGRESS NOTE ADULT - SUBJECTIVE AND OBJECTIVE BOX
PCP  Subjective:   in bed awake , alert     Objective:   T(F): 98.4 (10-09-17 @ 04:48), Max: 98.8 (10-07-17 @ 20:45)  HR: 89 (10-09-17 @ 04:48) (66 - 93)  BP: 107/63 (10-09-17 @ 04:48) (96/60 - 126/62)  RR: 16 (10-09-17 @ 04:48) (15 - 16)  SpO2: 98% (10-09-17 @ 04:48) (92% - 100%)  Wt(kg): --  Daily     Daily Weight in k.3 (09 Oct 2017 04:48)    GENERAL:  physical exam same as yesterday over all stable   EYES:   NECK:   CHEST/LUNG:   HEART:   ABDOMEN:   EXTREMITIES:    SKIN:  CNS:    Allergies: Allergies    No Known Allergies    Intolerances        Medications:   acetaminophen   Tablet 650 milliGRAM(s) Oral every 6 hours PRN  ascorbic acid 500 milliGRAM(s) Oral daily  aspirin  chewable 81 milliGRAM(s) Oral daily  atorvastatin 10 milliGRAM(s) Oral at bedtime  calcitriol   Capsule 0.25 MICROGram(s) Oral every 48 hours  calcium carbonate 500 mG (Tums) Chewable 1 Tablet(s) Chew three times a day  cefuroxime   Tablet 250 milliGRAM(s) Oral every 12 hours  dextrose 50% Injectable 12.5 Gram(s) IV Push once  dextrose 50% Injectable 25 Gram(s) IV Push once  dextrose 50% Injectable 25 Gram(s) IV Push once  dextrose Gel 1 Dose(s) Oral once PRN  docusate sodium 100 milliGRAM(s) Oral two times a day  enoxaparin Injectable 30 milliGRAM(s) SubCutaneous daily  epoetin kori Injectable 44977 Unit(s) SubCutaneous <User Schedule>  ferrous    sulfate 325 milliGRAM(s) Oral daily  folic acid 1 milliGRAM(s) Oral daily  glucagon  Injectable 1 milliGRAM(s) IntraMuscular once PRN  lactobacillus acidophilus 1 Tablet(s) Oral two times a day with meals  levothyroxine 88 MICROGram(s) Oral daily  metoprolol succinate ER 25 milliGRAM(s) Oral daily  Nephro-jonas 1 Tablet(s) Oral daily      LABS:                        9.9    8.1   )-----------( 187      ( 07 Oct 2017 09:38 )             32.0     10-07    139  |  101  |  41<H>  ----------------------------<  185<H>  3.9   |  27  |  3.00<H>    Ca    8.9      07 Oct 2017 09:38              CAPILLARY BLOOD GLUCOSE                RECENT CULTURES:  Culture Results:   No growth at 5 days. (10-03 @ 18:20)  Culture Results:   No growth at 5 days. (10-03 @ 18:20)  Culture Results:   >100,000 CFU/ml Escherichia coli (10-03 @ 18:11)

## 2017-10-09 NOTE — PROGRESS NOTE ADULT - ASSESSMENT
85 F w hx of CAD s/p CABG, HTN, DM, HLD, mild ICM, severe MR, hypothyroid, PPM presents for near syncope/AMS.    - TTE reviewed, poor study. EF 50% with mod-severe MR.  - her volume status is much improved and she is generally feeling better.  - s/p IV lasix for the last few days. She is euvolemic on exam. Her Lasix has been stopped because of TITA.  - Watch volume status closely. She should be on a maintenance dose of Lasix at 40 mg po daily. If creatinine improves tomorrow, I would d/c her on Lasix 40 po daily.  - Monitor strict I & O and daily weights  - Monitor renal function; Losartan reintroduced by Renal and creatinine is increasing so it has been stopped.  - Doppler of BLE negative of DVT  - Cont current DM meds.  - Abx for UTI per primary  - PT does not want to pursue a mitral clip  - Toprol XL 25, BP is at goal  - Monitor and replete electrolytes. Keep K>4.0 and Mg>2.0.  - Further cardiac workup will depend on clinical course.  - d/c planning to rehab 85 F w hx of CAD s/p CABG, HTN, DM, HLD, mild ICM, severe MR, hypothyroid, PPM presents for near syncope/AMS.    - TTE reviewed, poor study. EF 50% with mod-severe MR.  - her volume status is much improved and she is generally feeling better walking oob to commode with slight SOB.  - Lasix continues to be on hold was on IV lasix for the last few days with wt 91 kg -> 85.3 kg. She is with mild basal rales in LLL with mild dyspnea when OOB to commode.  Her Lasix has been stopped because of TITA with CR 3.0 and no obstruction seen on Renal US.  - Watch volume status closely. She should be on a maintenance dose of Lasix at 40 mg po daily. Labs pending would resume if creatinine improves today and would d/c her on Lasix 40 po daily.  - Monitor strict I & O and daily weights  - Monitor renal function; Losartan reintroduced by Renal and creatinine is increasing so it has been stopped.  - Doppler of BLE negative of DVT  - Cont current DM meds.  - Abx for UTI per primary  - PT does not want to pursue a mitral clip  - Toprol XL 25, BP is at goal  - Monitor and replete electrolytes. Keep K>4.0 and Mg>2.0.  - Further cardiac workup will depend on clinical course.  - d/c planning to rehab    Alysa Torres NP-C  Cardiology 85 F w hx of CAD s/p CABG, HTN, DM, HLD, mild ICM, severe MR, hypothyroid, PPM presents for near syncope/AMS.    - TTE reviewed, poor study. EF 50% with mod-severe MR.  - her volume status is much improved and she is generally feeling better walking oob to commode with slight SOB.  - aggressive diuresis with  6kg weight loss, treated sxs well, but at the exspense of significant TITA.  Her Lasix has been stopped with CR 3.0  - Watch volume status closely. She should be on a maintenance dose of Lasix at 40 mg po daily.  Will need to eventually resume when renal fxn approaches her baseline  - Monitor strict I & O and daily weights  - off nephrotoxic neds  - PT does not want to pursue a mitral clip  - cont Toprol XL 25, BP is at goal  - Monitor and replete electrolytes. Keep K>4.0 and Mg>2.0.  - Further cardiac workup will depend on clinical course.  - d/c planning to rehab when medically stable    Alysa Torres NP-C  Cardiology

## 2017-10-10 VITALS
SYSTOLIC BLOOD PRESSURE: 111 MMHG | RESPIRATION RATE: 16 BRPM | DIASTOLIC BLOOD PRESSURE: 69 MMHG | HEART RATE: 88 BPM | OXYGEN SATURATION: 94 % | TEMPERATURE: 98 F

## 2017-10-10 DIAGNOSIS — K29.00 ACUTE GASTRITIS WITHOUT BLEEDING: ICD-10-CM

## 2017-10-10 LAB
ANION GAP SERPL CALC-SCNC: 12 MMOL/L — SIGNIFICANT CHANGE UP (ref 5–17)
BUN SERPL-MCNC: 60 MG/DL — HIGH (ref 7–23)
CALCIUM SERPL-MCNC: 11.1 MG/DL — HIGH (ref 8.5–10.1)
CHLORIDE SERPL-SCNC: 95 MMOL/L — LOW (ref 96–108)
CO2 SERPL-SCNC: 28 MMOL/L — SIGNIFICANT CHANGE UP (ref 22–31)
CREAT SERPL-MCNC: 2.9 MG/DL — HIGH (ref 0.5–1.3)
GLUCOSE SERPL-MCNC: 273 MG/DL — HIGH (ref 70–99)
POTASSIUM SERPL-MCNC: 4.1 MMOL/L — SIGNIFICANT CHANGE UP (ref 3.5–5.3)
POTASSIUM SERPL-SCNC: 4.1 MMOL/L — SIGNIFICANT CHANGE UP (ref 3.5–5.3)
SODIUM SERPL-SCNC: 135 MMOL/L — SIGNIFICANT CHANGE UP (ref 135–145)

## 2017-10-10 PROCEDURE — 99233 SBSQ HOSP IP/OBS HIGH 50: CPT

## 2017-10-10 RX ORDER — PANTOPRAZOLE SODIUM 20 MG/1
40 TABLET, DELAYED RELEASE ORAL
Qty: 0 | Refills: 0 | Status: DISCONTINUED | OUTPATIENT
Start: 2017-10-10 | End: 2017-10-10

## 2017-10-10 RX ORDER — INSULIN GLARGINE 100 [IU]/ML
5 INJECTION, SOLUTION SUBCUTANEOUS
Qty: 0 | Refills: 0 | COMMUNITY
Start: 2017-10-10

## 2017-10-10 RX ORDER — INSULIN GLARGINE 100 [IU]/ML
5 INJECTION, SOLUTION SUBCUTANEOUS EVERY MORNING
Qty: 0 | Refills: 0 | Status: DISCONTINUED | OUTPATIENT
Start: 2017-10-10 | End: 2017-10-10

## 2017-10-10 RX ORDER — SUCRALFATE 1 G
1 TABLET ORAL ONCE
Qty: 0 | Refills: 0 | Status: COMPLETED | OUTPATIENT
Start: 2017-10-10 | End: 2017-10-10

## 2017-10-10 RX ORDER — PANTOPRAZOLE SODIUM 20 MG/1
1 TABLET, DELAYED RELEASE ORAL
Qty: 0 | Refills: 0 | COMMUNITY
Start: 2017-10-10

## 2017-10-10 RX ORDER — INSULIN LISPRO 100/ML
0 VIAL (ML) SUBCUTANEOUS
Qty: 0 | Refills: 0 | COMMUNITY
Start: 2017-10-10

## 2017-10-10 RX ADMIN — Medication 325 MILLIGRAM(S): at 12:22

## 2017-10-10 RX ADMIN — Medication 1 TABLET(S): at 08:41

## 2017-10-10 RX ADMIN — Medication 250 MILLIGRAM(S): at 05:22

## 2017-10-10 RX ADMIN — Medication 3: at 07:41

## 2017-10-10 RX ADMIN — ERYTHROPOIETIN 10000 UNIT(S): 10000 INJECTION, SOLUTION INTRAVENOUS; SUBCUTANEOUS at 14:39

## 2017-10-10 RX ADMIN — Medication 88 MICROGRAM(S): at 05:22

## 2017-10-10 RX ADMIN — Medication 1 MILLIGRAM(S): at 12:22

## 2017-10-10 RX ADMIN — Medication 100 MILLIGRAM(S): at 05:22

## 2017-10-10 RX ADMIN — ENOXAPARIN SODIUM 30 MILLIGRAM(S): 100 INJECTION SUBCUTANEOUS at 12:23

## 2017-10-10 RX ADMIN — Medication 25 MILLIGRAM(S): at 05:22

## 2017-10-10 RX ADMIN — Medication 81 MILLIGRAM(S): at 12:22

## 2017-10-10 RX ADMIN — Medication 500 MILLIGRAM(S): at 12:22

## 2017-10-10 RX ADMIN — Medication 1 TABLET(S): at 12:22

## 2017-10-10 RX ADMIN — Medication 3: at 12:18

## 2017-10-10 RX ADMIN — Medication 1 TABLET(S): at 05:22

## 2017-10-10 RX ADMIN — Medication 1 TABLET(S): at 13:09

## 2017-10-10 RX ADMIN — PANTOPRAZOLE SODIUM 40 MILLIGRAM(S): 20 TABLET, DELAYED RELEASE ORAL at 08:51

## 2017-10-10 RX ADMIN — INSULIN GLARGINE 5 UNIT(S): 100 INJECTION, SOLUTION SUBCUTANEOUS at 09:07

## 2017-10-10 NOTE — PROGRESS NOTE ADULT - PROBLEM SELECTOR PLAN 9
tylenol and patient
tylenol and patient
asa , and monitor
tylenol and patient

## 2017-10-10 NOTE — PROGRESS NOTE ADULT - PROBLEM SELECTOR PROBLEM 3
HTN (hypertension)
Type 2 diabetes mellitus with other circulatory complication, unspecified long term insulin use status
HTN (hypertension)
Type 2 diabetes mellitus with other circulatory complication, unspecified long term insulin use status

## 2017-10-10 NOTE — PROGRESS NOTE ADULT - PROBLEM SELECTOR PROBLEM 4
High cholesterol
Coronary artery disease involving transplanted heart, angina presence unspecified, unspecified vessel or lesion type
High cholesterol
Coronary artery disease involving transplanted heart, angina presence unspecified, unspecified vessel or lesion type
High cholesterol

## 2017-10-10 NOTE — PROGRESS NOTE ADULT - PROBLEM SELECTOR PLAN 2
ACUTE RENAL FAILURE:   Serum creatinine is stable and improved    , approximating GFR is controlled    ml/min.   [There is no progression]. [No uremic symptoms]   [No evidence of anemia].  Fluid status stable.  Will continue to avoid nephrotoxic drugs.  Patient remains asymptomatic.   Continue current therapy.
rocephine follow up cultures  post abx now
ACUTE RENAL FAILURE:   Serum creatinine is stable and improved    , approximating GFR is controlled    ml/min.   [There is no progression]. [No uremic symptoms]   [No evidence of anemia].  Fluid status stable.  Will continue to avoid nephrotoxic drugs.  Patient remains asymptomatic.   Continue current therapy.
rocephine follow up cultures

## 2017-10-10 NOTE — PROGRESS NOTE ADULT - PROBLEM SELECTOR PLAN 1
CHRONIC KIDNEY DISEASE, STAGE 4:   The latest Creatinine/GRF is controlled  It has   remained stable  compared to the previous result.   Hemoglobin is low.  Target is 11-12 g/dL. Repeat in 3 months.   Bicarbonate is ok . Target is >22 mmol/L. Repeat in 3 months. ]  Phosphorus is ok .  Target is 100 pg/mL or 1.5x normal.   Repeat in 3 months if Ca or PO4 is abnormal.   25(OH) Vitamin D is pend.  Target is >30ng/mL. Repeat if iPTH is abnormal.   Blood Pressure is  120/78 .  Target is 130/80 mmHg. Repeat in 3 months.   ACEI or ARB dose decreased   Body Weight is controlled   Target is unintentional weight loss 4 g/dL by BCG or >3.7 g/dL by BCP.   Repeat in 3 months.
fs coverage , iv d5  post syncope and hypoglycemia ,   monitor blood sugars consider lantus down the road
CHRONIC KIDNEY DISEASE, STAGE 4:   The latest Creatinine  increased   GRF is decreased      Hemoglobin is low.  Target is 11-12 g/dL. Repeat in 3 months.   Bicarbonate is ok . Target is >22 mmol/L. Repeat in 3 months. ]  Phosphorus is ok .  Target is 100 pg/mL or 1.5x normal.   Repeat in 3 months if Ca or PO4 is abnormal.   25(OH) Vitamin D is pend.  Target is >30ng/mL. Repeat if iPTH is abnormal.   Blood Pressure is  120/78 .  Target is 130/80 mmHg. Repeat in 3 months.   ACEI or ARB dose decreased   Body Weight is controlled   Target is unintentional weight loss 4 g/dL by BCG or >3.7 g/dL by BCP.   Repeat in 3 months.
CHRONIC KIDNEY DISEASE, STAGE 4:   The latest Creatinine/GRF is controlled  It has   remained stable  compared to the previous result.   Hemoglobin is low.  Target is 11-12 g/dL. Repeat in 3 months.   Bicarbonate is ok . Target is >22 mmol/L. Repeat in 3 months. ]  Phosphorus is ok .  Target is 100 pg/mL or 1.5x normal.   Repeat in 3 months if Ca or PO4 is abnormal.   25(OH) Vitamin D is pend.  Target is >30ng/mL. Repeat if iPTH is abnormal.   Blood Pressure is  120/78 .  Target is 130/80 mmHg. Repeat in 3 months.   ACEI or ARB dose decreased   Body Weight is controlled   Target is unintentional weight loss 4 g/dL by BCG or >3.7 g/dL by BCP.   Repeat in 3 months.
fs coverage , iv d5
fs coverage , iv d5  post syncope and hypoglycemia ,   monitor blood sugars consider lantus down the road
fs coverage , iv d5  post syncope and hypoglycemia ,   monitor blood sugars consider lantus down the road

## 2017-10-10 NOTE — PROGRESS NOTE ADULT - PROBLEM SELECTOR PLAN 5
bp meds Norvasc

## 2017-10-10 NOTE — PROGRESS NOTE ADULT - ASSESSMENT
85 F w hx of CAD s/p CABG, HTN, DM, HLD, mild ICM, severe MR, hypothyroid, PPM presents for near syncope/AMS.    - TTE reviewed, poor study. EF 50% with mod-severe MR.  - her volume status is much improved and she is generally feeling better walking oob to commode with slight SOB.  - aggressive diuresis with  6kg weight loss, treated sxs well, but at the expense of significant TITA.  Her Lasix has been stopped with CR 3.0 steadily increased   - Watch volume status closely. She should be on a maintenance dose of Lasix at 40 mg po daily.  Will need to eventually resume when renal fxn approaches her baseline  - Monitor strict I & O and daily weights  - off nephrotoxic meds  - PT does not want to pursue a mitral clip  - cont Toprol XL 25, BP is at goal  - Monitor and replete electrolytes. Keep K>4.0 and Mg>2.0.  - Further cardiac workup will depend on clinical course.  - d/c planning to rehab when medically stable    Alysa Torres NP-C  Cardiology

## 2017-10-10 NOTE — PROGRESS NOTE ADULT - PROBLEM SELECTOR PROBLEM 10
Coronary artery disease involving transplanted heart, angina presence unspecified, unspecified vessel or lesion type
Coronary artery disease involving transplanted heart, angina presence unspecified, unspecified vessel or lesion type
Acute gastritis without hemorrhage, unspecified gastritis type
Coronary artery disease involving transplanted heart, angina presence unspecified, unspecified vessel or lesion type

## 2017-10-10 NOTE — PROGRESS NOTE ADULT - SUBJECTIVE AND OBJECTIVE BOX
Patient is a 85y Female with past medical history of           presenting with        who reports no complaints overnight.    REVIEW OF SYSTEMS:    CONSTITUTIONAL: No  fevers or chills    Allergies    No Known Allergies    Intolerances        MEDICATIONS  (STANDING):  ascorbic acid 500 milliGRAM(s) Oral daily  aspirin  chewable 81 milliGRAM(s) Oral daily  atorvastatin 10 milliGRAM(s) Oral at bedtime  calcium carbonate 500 mG (Tums) Chewable 1 Tablet(s) Chew three times a day  dextrose 5%. 1000 milliLiter(s) (50 mL/Hr) IV Continuous <Continuous>  dextrose 50% Injectable 12.5 Gram(s) IV Push once  dextrose 50% Injectable 25 Gram(s) IV Push once  dextrose 50% Injectable 25 Gram(s) IV Push once  dextrose 50% Injectable 12.5 Gram(s) IV Push once  dextrose 50% Injectable 25 Gram(s) IV Push once  dextrose 50% Injectable 25 Gram(s) IV Push once  docusate sodium 100 milliGRAM(s) Oral two times a day  enoxaparin Injectable 30 milliGRAM(s) SubCutaneous daily  epoetin kori Injectable 10122 Unit(s) SubCutaneous <User Schedule>  ferrous    sulfate 325 milliGRAM(s) Oral daily  folic acid 1 milliGRAM(s) Oral daily  insulin glargine Injectable (LANTUS) 5 Unit(s) SubCutaneous every morning  insulin lispro (HumaLOG) corrective regimen sliding scale   SubCutaneous three times a day before meals  lactobacillus acidophilus 1 Tablet(s) Oral two times a day with meals  levothyroxine 88 MICROGram(s) Oral daily  metoprolol succinate ER 25 milliGRAM(s) Oral daily  Nephro-jonas 1 Tablet(s) Oral daily  pantoprazole    Tablet 40 milliGRAM(s) Oral before breakfast  sodium chloride 0.45%. 1000 milliLiter(s) (50 mL/Hr) IV Continuous <Continuous>      Vitals:  T(F): 98.4 (10-10-17 @ 04:52), Max: 98.9 (10-09-17 @ 20:47)  HR: 83 (10-10-17 @ 04:52)  BP: 153/87 (10-10-17 @ 04:52)  BP(mean): --  RR: 17 (10-10-17 @ 04:52)  SpO2: 97% (10-10-17 @ 04:52)  Wt(kg): --    I and O's:    10-09 @ 07:01  -  10-10 @ 07:00  --------------------------------------------------------  IN: 550 mL / OUT: 0 mL / NET: 550 mL            PHYSICAL EXAM:    Constitutional: NAD,   Neck: No JVD  Respiratory: CTAB  Cardiovascular: S1 and S2  Gastrointestinal: BS+, soft, NT/ND  Extremities: No peripheral edema  Neurological: , no focal deficits    LABS:                        10.2   9.7   )-----------( 187      ( 09 Oct 2017 08:32 )             33.6       135    |  95     |  60     ----------------------------<  273       10 Oct 2017 08:46  4.1     |  28     |  2.90     137    |  97     |  52     ----------------------------<  251       09 Oct 2017 08:32  3.8     |  31     |  3.10     139    |  101    |  41     ----------------------------<  185       07 Oct 2017 09:38  3.9     |  27     |  3.00     Ca    11.1       10 Oct 2017 08:46  Ca    10.8       09 Oct 2017 08:32            Serum Osmo:   Serum Uric Acid:   PERRI:   Double Stranded DNA:   C3:   C3 Nephritic Factor:   C4:   p-ANCA:   c-ANCA:   Anti-GBM:   ODIN-Smith Antibody:   Immunofixation:   [place]/Lambda Free Light Chain:   SPEP:   Hepatitis Panel:   HIV-1/2:     Urine Studies:      Urine chemistry:   Urine Na:   Urine Creatinine:   Urine Protein/Cr ratio:  Urine K:   Urine Osm:   24 Hr urine studies:     24 hr urine Creatinine Clearance:    RADIOLOGY & ADDITIONAL STUDIES:

## 2017-10-10 NOTE — PROGRESS NOTE ADULT - PROBLEM SELECTOR PLAN 4
atorvastatin
Admit to telemetry unit for monitoring,send 3 sets of cardiac ensymes to rule out coronary event,obtain ECHO to evaluate LVEF,cardiology consult,continue current managmnet,O2 supply,anticoagulation plan as per cardiology consult
atorvastatin
Admit to telemetry unit for monitoring,send 3 sets of cardiac ensymes to rule out coronary event,obtain ECHO to evaluate LVEF,cardiology consult,continue current managmnet,O2 supply,anticoagulation plan as per cardiology consult
atorvastatin

## 2017-10-10 NOTE — PROGRESS NOTE ADULT - SUBJECTIVE AND OBJECTIVE BOX
PCP  Subjective:   in bed , awake , vomited post dinner last night thinks it was the dinner, denies abd pain this am     Objective:   T(F): 98.4 (10-10-17 @ 04:52), Max: 98.9 (10-09-17 @ 20:47)  HR: 83 (10-10-17 @ 04:52) (66 - 93)  BP: 153/87 (10-10-17 @ 04:52) (105/61 - 153/87)  RR: 17 (10-10-17 @ 04:52) (15 - 17)  SpO2: 97% (10-10-17 @ 04:52) (96% - 99%)  Wt(kg): --  Daily     Daily Weight in k.2 (10 Oct 2017 07:41)    GENERAL:  wdwn f  EYES: eomi  NECK: supple  CHEST/LUNG: clear  HEART: s1 s2 regular   ABDOMEN: soft nt + bs  EXTREMITIES:  no edema   SKIN: warm   CNS: awake , alert , responsive non focal , weak     Allergies: Allergies    No Known Allergies    Intolerances        Medications:   acetaminophen   Tablet 650 milliGRAM(s) Oral every 6 hours PRN  ascorbic acid 500 milliGRAM(s) Oral daily  aspirin  chewable 81 milliGRAM(s) Oral daily  atorvastatin 10 milliGRAM(s) Oral at bedtime  calcium carbonate 500 mG (Tums) Chewable 1 Tablet(s) Chew three times a day  dextrose 5%. 1000 milliLiter(s) IV Continuous <Continuous>  dextrose 50% Injectable 12.5 Gram(s) IV Push once  dextrose 50% Injectable 25 Gram(s) IV Push once  dextrose 50% Injectable 25 Gram(s) IV Push once  dextrose 50% Injectable 12.5 Gram(s) IV Push once  dextrose 50% Injectable 25 Gram(s) IV Push once  dextrose 50% Injectable 25 Gram(s) IV Push once  dextrose Gel 1 Dose(s) Oral once PRN  dextrose Gel 1 Dose(s) Oral once PRN  docusate sodium 100 milliGRAM(s) Oral two times a day  enoxaparin Injectable 30 milliGRAM(s) SubCutaneous daily  epoetin kori Injectable 90476 Unit(s) SubCutaneous <User Schedule>  ferrous    sulfate 325 milliGRAM(s) Oral daily  folic acid 1 milliGRAM(s) Oral daily  glucagon  Injectable 1 milliGRAM(s) IntraMuscular once PRN  glucagon  Injectable 1 milliGRAM(s) IntraMuscular once PRN  insulin glargine Injectable (LANTUS) 5 Unit(s) SubCutaneous every morning  insulin lispro (HumaLOG) corrective regimen sliding scale   SubCutaneous three times a day before meals  lactobacillus acidophilus 1 Tablet(s) Oral two times a day with meals  levothyroxine 88 MICROGram(s) Oral daily  metoprolol succinate ER 25 milliGRAM(s) Oral daily  Nephro-jonas 1 Tablet(s) Oral daily  pantoprazole    Tablet 40 milliGRAM(s) Oral before breakfast  sodium chloride 0.45%. 1000 milliLiter(s) IV Continuous <Continuous>      LABS:                        10.2   9.7   )-----------( 187      ( 09 Oct 2017 08:32 )             33.6     10-09    137  |  97  |  52<H>  ----------------------------<  251<H>  3.8   |  31  |  3.10<H>    Ca    10.8<H>      09 Oct 2017 08:32              CAPILLARY BLOOD GLUCOSE  299 (10 Oct 2017 07:36)  170 (09 Oct 2017 20:27)  281 (09 Oct 2017 17:11)                RECENT CULTURES:  Culture Results:   No growth at 5 days. (10-03 @ 18:20)  Culture Results:   No growth at 5 days. (10-03 @ 18:20)  Culture Results:   >100,000 CFU/ml Escherichia coli (10-03 @ 18:11)

## 2017-10-10 NOTE — PROGRESS NOTE ADULT - SUBJECTIVE AND OBJECTIVE BOX
HPI:  86 yo f ,  , lives with dtr , in her own apartment , former smoker , hx of diabetes mellitus type 2 , osteoarthritis , hypertension , osteoporosis , obesity , cabg , ppm, and severe MR  patient has had an episode of hypoglycemia last Sunday ems called to house and was given glucagon and recovered ,  , then today confused , and with change in mental status ,no syncope , and with low fs glucometer test and brought to er , given d50 and  , later in er alert and responsive but fuggy , eating some cookies ,   she takes 25 units Lantus bid , has not been by md in months , has difficulty ambulating , no n/v , no cp , no sob , no head aches , no rash , no fever , no chills , no dysuria , (03 Oct 2017 23:55)      SUBJECTIVE:  Patient is a 85y old  Female who presents with a chief complaint of hypoglycemia , change in mental status (04 Oct 2017 15:49)          OBJECTIVE:  Review Of Systems:  Constitutional: [ ] Fever [ ] Chills [ ] Fatigue [ ] Weight change   HEENT: [ ] Blurred vision [ ] Eye Pain [ ] Headache [ ] Runny nose [ ] Sore Throat   Respiratory: [ ] Cough [ ] Wheezing [ ] Shortness of breath  Cardiovascular: [ ] Chest Pain [ ] Palpitations [ ] CHILDERS [ ] PND [ ] Orthopnea  Gastrointestinal: [ ] Abdominal Pain [ ] Diarrhea [ ] Constipation [ ] Hemorrhoids [ ] Nausea [ ] Vomiting  Genitourinary: [ ] Nocturia [ ] Dysuria [ ] Incontinence  Extremities: [ ] Swelling [ ] Joint Pain  Neurologic: [ ] Focal deficit [ ] Paresthesias [ ] Syncope  Lymphatic: [ ] Swelling [ ] Lymphadenopathy   Skin: [ ] Rash [ ] Ecchymoses [ ] Wounds [ ] Lesions  Psychiatry: [ ] Depression [ ] Suicidal/Homicidal Ideation [ ] Anxiety [ ] Sleep Disturbances  [ ] 10 point review of systems is otherwise negative except as mentioned above            [ ]Unable to obtain    Allergy:  Allergies    No Known Allergies    Intolerances        Medications:  MEDICATIONS  (STANDING):  ascorbic acid 500 milliGRAM(s) Oral daily  aspirin  chewable 81 milliGRAM(s) Oral daily  atorvastatin 10 milliGRAM(s) Oral at bedtime  calcium carbonate 500 mG (Tums) Chewable 1 Tablet(s) Chew three times a day  cefuroxime   Tablet 250 milliGRAM(s) Oral every 12 hours  dextrose 5%. 1000 milliLiter(s) (50 mL/Hr) IV Continuous <Continuous>  dextrose 50% Injectable 12.5 Gram(s) IV Push once  dextrose 50% Injectable 25 Gram(s) IV Push once  dextrose 50% Injectable 25 Gram(s) IV Push once  dextrose 50% Injectable 12.5 Gram(s) IV Push once  dextrose 50% Injectable 25 Gram(s) IV Push once  dextrose 50% Injectable 25 Gram(s) IV Push once  docusate sodium 100 milliGRAM(s) Oral two times a day  enoxaparin Injectable 30 milliGRAM(s) SubCutaneous daily  epoetin kori Injectable 54063 Unit(s) SubCutaneous <User Schedule>  ferrous    sulfate 325 milliGRAM(s) Oral daily  folic acid 1 milliGRAM(s) Oral daily  insulin lispro (HumaLOG) corrective regimen sliding scale   SubCutaneous three times a day before meals  lactobacillus acidophilus 1 Tablet(s) Oral two times a day with meals  levothyroxine 88 MICROGram(s) Oral daily  metoprolol succinate ER 25 milliGRAM(s) Oral daily  Nephro-jonas 1 Tablet(s) Oral daily  sodium chloride 0.45%. 1000 milliLiter(s) (50 mL/Hr) IV Continuous <Continuous>    MEDICATIONS  (PRN):  acetaminophen   Tablet 650 milliGRAM(s) Oral every 6 hours PRN For Temp greater than 38 C (100.4 F)  dextrose Gel 1 Dose(s) Oral once PRN Blood Glucose LESS THAN 70 milliGRAM(s)/deciliter  dextrose Gel 1 Dose(s) Oral once PRN Blood Glucose LESS THAN 70 milliGRAM(s)/deciliter  glucagon  Injectable 1 milliGRAM(s) IntraMuscular once PRN Glucose LESS THAN 70 milligrams/deciliter  glucagon  Injectable 1 milliGRAM(s) IntraMuscular once PRN Glucose LESS THAN 70 milligrams/deciliter      PMH/PSH/FH/SH: [ ] Unchanged    Vitals:  T(C): 36.9 (10-10-17 @ 04:52), Max: 37.2 (10-09-17 @ 20:47)  HR: 83 (10-10-17 @ 04:52) (83 - 91)  BP: 153/87 (10-10-17 @ 04:52) (112/61 - 153/87)  BP(mean): --  RR: 17 (10-10-17 @ 04:52) (15 - 17)  SpO2: 97% (10-10-17 @ 04:52) (96% - 97%)  Wt(kg): --  Daily     Daily   I&O's Summary    09 Oct 2017 07:01  -  10 Oct 2017 07:00  --------------------------------------------------------  IN: 550 mL / OUT: 0 mL / NET: 550 mL        Labs:                        10.2   9.7   )-----------( 187      ( 09 Oct 2017 08:32 )             33.6     10-09    137  |  97  |  52<H>  ----------------------------<  251<H>  3.8   |  31  |  3.10<H>    Ca    10.8<H>      09 Oct 2017 08:32                        ECG:    Echo:    Stress Testing:     Cath:    Imaging:    Interpretation of Telemetry:      Physical Exam:  Appearance: [ x] Normal  [ ] abnormal [x ] NAD   Eyes: [x ] PERRL [ x] EOMI  HENT: [x ] Normal [ ] Abnormal oral [place]a [x ]NC/AT  Cardiovascular: [x ] S1 [x ] S2 [x ] RRR [x ] m/r/g [ ]edema [ ] JVP  Procedural Access Site: [ ]  hematoma [ ] tender to palpation [ ] 2+ pulse [ ] bruit [ ] Ecchymosis  Respiratory: [ ] basal rales left > right   Gastrointestinal: [ x] Soft [ ] tenderness[ ] distension [x ] BS  Musculoskeletal: [ ] clubbing [ ] joint deformity   Neurologic: [x ] Non-focal  Lymphatic: [ ] lymphadenopathy  Psychiatry: [x ] AAOx3  [ ] confused [ ] disoriented [x ] Mood & affect appropriate  Skin: [ ]  rashes [ ] ecchymoses [ ] cyanosis

## 2017-10-10 NOTE — PROGRESS NOTE ADULT - ASSESSMENT
86 yo f ,  , lives with dtr , in her own apartment , former smoker , hx of diabetes mellitus type 2 , osteoarthritis , hypertension , osteoporosis , obesity , cabg , ppm, and severe MR   patient has had an episode of hypoglycemia last Sunday ems called to house and was given glucagon , then today confused , and with change in mental status , and with low fs glucometer test and brought to er , given d50 and recovered  , later in er alert and responsive but fuggy ,   she takes 25 units Lantus bid , has not been by md in months , has difficulty ambulating , no n/v , no cp , no sob , no head aches , no rash , no fever , no chills , no dysuria ,   in er also abnl gfr , and anemia ,   on 10/4/2017 patient stable ,   blood sugar better ,   TITA on ckd , nephro to follow up , hydrate , and ck renal sono ,   dm fs and coverage hold lantus for now ,  coronary artery disease and MR , cardio to eval resume bp meds and asa , get echo    dvt and gi prophylax , and get PT eval , consider rehab  ? uti on iv abx , f/u cultures ,   on 10/5/2017 feels better , dm noted , hg a1c low , will dc insulin , and fs , and uti on iv abx   ckd and tita on ckd nephro noted , , case rev with dtr , dvt gi prophylax , plan rehab   on  10/6/2017 doing better , plan rehab , uti on po abx ,   on 10/8/2017 patient over all stable , awaiting rehab , and bs elevating , will monitor , post uti ,   CKD being monitored by nephro , may need eventual dialysis ,   Mitral regurge , refusing clip for now ,   on 10/9/17 patient stable , plan rehab , on po abx , rest of conditions as above   on 10/10/17 patient had an episode of vomiting last night , and blood sugar is higher    will do lantus and fs coverage , and dc po abx for uti , and plan rehab , and add ppi for possible gastritis

## 2017-10-10 NOTE — PROGRESS NOTE ADULT - PROBLEM SELECTOR PROBLEM 8
Stage 4 chronic kidney disease

## 2017-10-10 NOTE — PROGRESS NOTE ADULT - PROBLEM SELECTOR PROBLEM 1
Diabetic hypoglycemia
Stage 4 chronic kidney disease
Diabetic hypoglycemia
Stage 4 chronic kidney disease
Diabetic hypoglycemia
Diabetic hypoglycemia

## 2017-10-10 NOTE — PROGRESS NOTE ADULT - ATTENDING COMMENTS
Chart reviewed  Pt seen and examined  Agree with plan as outlined above
Chart reviewed    Patient seen and examined    Agree with plan as outlined above

## 2017-10-10 NOTE — PROGRESS NOTE ADULT - PROVIDER SPECIALTY LIST ADULT
Cardiology
Internal Medicine
Nephrology
Cardiology
Internal Medicine
Nephrology
Internal Medicine
Internal Medicine

## 2017-10-10 NOTE — PROGRESS NOTE ADULT - PROBLEM SELECTOR PLAN 10
asa , and monitor
asa , and monitor
protonix
asa , and monitor

## 2017-10-10 NOTE — PROGRESS NOTE ADULT - PROBLEM SELECTOR PROBLEM 2
TITA (acute kidney injury)
Urinary tract infection without hematuria, site unspecified
TITA (acute kidney injury)
Urinary tract infection without hematuria, site unspecified

## 2017-10-10 NOTE — PROGRESS NOTE ADULT - PROBLEM SELECTOR PROBLEM 9
Arthritis
Arthritis
Coronary artery disease involving transplanted heart, angina presence unspecified, unspecified vessel or lesion type
Arthritis

## 2017-10-10 NOTE — PROGRESS NOTE ADULT - PROBLEM SELECTOR PLAN 3
fs coverage
BP monitoring,continue current antihypertensive meds, low salt diet,followup with PMD in 1-2 weeks
fs coverage, add lantus as blood sugar rising
BP monitoring,continue current antihypertensive meds, low salt diet,followup with PMD in 1-2 weeks
fs coverage

## 2017-10-10 NOTE — PROGRESS NOTE ADULT - PROBLEM SELECTOR PROBLEM 6
Hypothyroidism due to medication

## 2017-10-16 DIAGNOSIS — E66.9 OBESITY, UNSPECIFIED: ICD-10-CM

## 2017-10-16 DIAGNOSIS — D63.1 ANEMIA IN CHRONIC KIDNEY DISEASE: ICD-10-CM

## 2017-10-16 DIAGNOSIS — I48.91 UNSPECIFIED ATRIAL FIBRILLATION: ICD-10-CM

## 2017-10-16 DIAGNOSIS — M81.0 AGE-RELATED OSTEOPOROSIS WITHOUT CURRENT PATHOLOGICAL FRACTURE: ICD-10-CM

## 2017-10-16 DIAGNOSIS — E11.22 TYPE 2 DIABETES MELLITUS WITH DIABETIC CHRONIC KIDNEY DISEASE: ICD-10-CM

## 2017-10-16 DIAGNOSIS — I12.9 HYPERTENSIVE CHRONIC KIDNEY DISEASE WITH STAGE 1 THROUGH STAGE 4 CHRONIC KIDNEY DISEASE, OR UNSPECIFIED CHRONIC KIDNEY DISEASE: ICD-10-CM

## 2017-10-16 DIAGNOSIS — N17.9 ACUTE KIDNEY FAILURE, UNSPECIFIED: ICD-10-CM

## 2017-10-16 DIAGNOSIS — Z95.0 PRESENCE OF CARDIAC PACEMAKER: ICD-10-CM

## 2017-10-16 DIAGNOSIS — N39.0 URINARY TRACT INFECTION, SITE NOT SPECIFIED: ICD-10-CM

## 2017-10-16 DIAGNOSIS — Z87.891 PERSONAL HISTORY OF NICOTINE DEPENDENCE: ICD-10-CM

## 2017-10-16 DIAGNOSIS — Z79.4 LONG TERM (CURRENT) USE OF INSULIN: ICD-10-CM

## 2017-10-16 DIAGNOSIS — E78.5 HYPERLIPIDEMIA, UNSPECIFIED: ICD-10-CM

## 2017-10-16 DIAGNOSIS — I34.0 NONRHEUMATIC MITRAL (VALVE) INSUFFICIENCY: ICD-10-CM

## 2017-10-16 DIAGNOSIS — K29.00 ACUTE GASTRITIS WITHOUT BLEEDING: ICD-10-CM

## 2017-10-16 DIAGNOSIS — E11.649 TYPE 2 DIABETES MELLITUS WITH HYPOGLYCEMIA WITHOUT COMA: ICD-10-CM

## 2017-10-16 DIAGNOSIS — N18.4 CHRONIC KIDNEY DISEASE, STAGE 4 (SEVERE): ICD-10-CM

## 2017-10-16 DIAGNOSIS — M19.90 UNSPECIFIED OSTEOARTHRITIS, UNSPECIFIED SITE: ICD-10-CM

## 2017-12-19 PROCEDURE — 84436 ASSAY OF TOTAL THYROXINE: CPT

## 2017-12-19 PROCEDURE — 97530 THERAPEUTIC ACTIVITIES: CPT

## 2017-12-19 PROCEDURE — 83970 ASSAY OF PARATHORMONE: CPT

## 2017-12-19 PROCEDURE — 97116 GAIT TRAINING THERAPY: CPT

## 2017-12-19 PROCEDURE — 82728 ASSAY OF FERRITIN: CPT

## 2017-12-19 PROCEDURE — 84443 ASSAY THYROID STIM HORMONE: CPT

## 2017-12-19 PROCEDURE — 96375 TX/PRO/DX INJ NEW DRUG ADDON: CPT

## 2017-12-19 PROCEDURE — 87040 BLOOD CULTURE FOR BACTERIA: CPT

## 2017-12-19 PROCEDURE — 85045 AUTOMATED RETICULOCYTE COUNT: CPT

## 2017-12-19 PROCEDURE — 96365 THER/PROPH/DIAG IV INF INIT: CPT

## 2017-12-19 PROCEDURE — 83605 ASSAY OF LACTIC ACID: CPT

## 2017-12-19 PROCEDURE — 85027 COMPLETE CBC AUTOMATED: CPT

## 2017-12-19 PROCEDURE — 80048 BASIC METABOLIC PNL TOTAL CA: CPT

## 2017-12-19 PROCEDURE — 81001 URINALYSIS AUTO W/SCOPE: CPT

## 2017-12-19 PROCEDURE — 99285 EMERGENCY DEPT VISIT HI MDM: CPT | Mod: 25

## 2017-12-19 PROCEDURE — 83036 HEMOGLOBIN GLYCOSYLATED A1C: CPT

## 2017-12-19 PROCEDURE — 93306 TTE W/DOPPLER COMPLETE: CPT

## 2017-12-19 PROCEDURE — 80053 COMPREHEN METABOLIC PANEL: CPT

## 2017-12-19 PROCEDURE — 87186 SC STD MICRODIL/AGAR DIL: CPT

## 2017-12-19 PROCEDURE — 97162 PT EVAL MOD COMPLEX 30 MIN: CPT

## 2017-12-19 PROCEDURE — 82310 ASSAY OF CALCIUM: CPT

## 2017-12-19 PROCEDURE — 82668 ASSAY OF ERYTHROPOIETIN: CPT

## 2017-12-19 PROCEDURE — 82962 GLUCOSE BLOOD TEST: CPT

## 2017-12-19 PROCEDURE — 93970 EXTREMITY STUDY: CPT

## 2017-12-19 PROCEDURE — 87086 URINE CULTURE/COLONY COUNT: CPT

## 2017-12-19 PROCEDURE — 83550 IRON BINDING TEST: CPT

## 2017-12-19 PROCEDURE — 84480 ASSAY TRIIODOTHYRONINE (T3): CPT

## 2017-12-19 PROCEDURE — 36415 COLL VENOUS BLD VENIPUNCTURE: CPT

## 2017-12-19 PROCEDURE — 96376 TX/PRO/DX INJ SAME DRUG ADON: CPT

## 2017-12-19 PROCEDURE — 76775 US EXAM ABDO BACK WALL LIM: CPT

## 2017-12-19 PROCEDURE — 84100 ASSAY OF PHOSPHORUS: CPT

## 2017-12-19 PROCEDURE — 71045 X-RAY EXAM CHEST 1 VIEW: CPT

## 2019-03-11 NOTE — DIETITIAN INITIAL EVALUATION ADULT. - NUTRITION INTERVENTION
[No Acute Distress] : no acute distress [Normal Sclera/Conjunctiva] : normal sclera/conjunctiva [Normal Outer Ear/Nose] : the outer ears and nose were normal in appearance [Normal TMs] : both tympanic membranes were normal [No Lymphadenopathy] : no lymphadenopathy [No Respiratory Distress] : no respiratory distress  [No Accessory Muscle Use] : no accessory muscle use [Normal Rate] : normal rate  [Regular Rhythm] : with a regular rhythm [No Edema] : there was no peripheral edema [No Focal Deficits] : no focal deficits [Normal Affect] : the affect was normal [de-identified] : Mildly ill appearing [de-identified] : Slight injection posterior pharynx Me[employer]/Nutrition Education

## 2020-07-07 NOTE — PROGRESS NOTE ADULT - SUBJECTIVE AND OBJECTIVE BOX
PCP  Subjective:   in bed , awake , feels better    Objective:   T(F): 98.3 (10-06-17 @ 13:10), Max: 99.2 (10-05-17 @ 04:57)  HR: 88 (10-06-17 @ 13:10) (76 - 107)  BP: 96/60 (10-06-17 @ 13:10) (96/60 - 129/73)  RR: 16 (10-06-17 @ 13:10) (15 - 18)  SpO2: 92% (10-06-17 @ 13:10) (92% - 98%)  Wt(kg): --  Daily     Daily     GENERAL:  wdwn f, awake , follows commands  EYES: eomi  NECK: supple, obese  CHEST/LUNG: clear  HEART: s1 s2 regular 2/6 feng   ABDOMEN: soft nt + bs  EXTREMITIES:  no edema legs , rt hand slight edema   SKIN: warm   CNS: alert non focal     Allergies: Allergies    No Known Allergies    Intolerances        Medications:   acetaminophen   Tablet 650 milliGRAM(s) Oral every 6 hours PRN  ascorbic acid 500 milliGRAM(s) Oral daily  aspirin  chewable 81 milliGRAM(s) Oral daily  atorvastatin 10 milliGRAM(s) Oral at bedtime  calcitriol   Capsule 0.25 MICROGram(s) Oral every 48 hours  calcium carbonate 500 mG (Tums) Chewable 1 Tablet(s) Chew three times a day  cefuroxime   Tablet 250 milliGRAM(s) Oral every 12 hours  dextrose 50% Injectable 12.5 Gram(s) IV Push once  dextrose 50% Injectable 25 Gram(s) IV Push once  dextrose 50% Injectable 25 Gram(s) IV Push once  dextrose Gel 1 Dose(s) Oral once PRN  docusate sodium 100 milliGRAM(s) Oral two times a day  enoxaparin Injectable 30 milliGRAM(s) SubCutaneous daily  epoetin kori Injectable 83338 Unit(s) SubCutaneous <User Schedule>  ferrous    sulfate 325 milliGRAM(s) Oral daily  folic acid 1 milliGRAM(s) Oral daily  furosemide    Tablet 40 milliGRAM(s) Oral daily  glucagon  Injectable 1 milliGRAM(s) IntraMuscular once PRN  lactobacillus acidophilus 1 Tablet(s) Oral two times a day with meals  levothyroxine 88 MICROGram(s) Oral daily  losartan 12.5 milliGRAM(s) Oral daily  metoprolol succinate ER 25 milliGRAM(s) Oral daily  Nephro-jonas 1 Tablet(s) Oral daily      LABS:                        9.0    7.3   )-----------( 188      ( 05 Oct 2017 08:00 )             30.8     10-05    143  |  108  |  31<H>  ----------------------------<  157<H>  4.2   |  26  |  2.70<H>    Ca    8.3<L>      05 Oct 2017 08:00  Phos  2.7     10-06              CAPILLARY BLOOD GLUCOSE  192 (06 Oct 2017 16:56)  198 (06 Oct 2017 11:53)  142 (06 Oct 2017 07:39)  171 (05 Oct 2017 22:12)                RECENT CULTURES:  Culture Results:   No growth to date. (10-03 @ 18:20)  Culture Results:   No growth to date. (10-03 @ 18:20)  Culture Results:   >100,000 CFU/ml Escherichia coli (10-03 @ 18:11) Adequate

## 2020-09-03 NOTE — ED PROVIDER NOTE - CPE EDP HEME LYMPH NORM
normal... Vital signs as available reviewed.  General:  Comfortable, no acute distress.  Head:  Normocephalic, atraumatic.  Eyes:  Conjunctiva pink, no icterus.  Cardiovascular:  Regular rate, no obvious murmur.  Respiratory:  Clear to auscultation, good air entry bilaterally.  Abdomen:  Soft, non-tender.  Musculoskeletal:  + left wrist tenderness. + left shoulder with decreased range of motion. right knee with decreased range of motion due to pain.  Neurologic: Alert and oriented, moving all extremities.  Skin:  Warm and dry. + left wrist abrasion with superficial laceration

## 2024-11-13 NOTE — DISCHARGE NOTE ADULT - NS AS DC PROVIDER CONTACT Y/N MULTI
Problem: Adult Inpatient Plan of Care  Goal: Plan of Care Review  Outcome: Progressing  Flowsheets (Taken 11/13/2024 8147)  Progress: improving  Plan of Care Reviewed With: patient   Goal Outcome Evaluation:  Pt slept well overnight. Scheduled tylenol given for pain. A&O x4. Remains on RA. VSS. Afebrile. Lap sites x4, clean, dry and intact. Spouse at bedside. All needs met. Plan for discharge today.  
PAST SURGICAL HISTORY:  H/O cardiac radiofrequency ablation approx. 12 years ago    Left breast mass Biopsy negative over 20 years ago; excision of mass    S/P foot surgery, right after work injury with reconstruction about 10 years ago    
Yes

## 2025-01-03 NOTE — ED PROVIDER NOTE - HEAD, MLM
1/3/25  Tatiana Mtz : 1984 Sex: female  Age 40 y.o.    Subjective:  Chief Complaint   Patient presents with    Cough    Congestion       HPI:   Tatiana Mtz , 40 y.o. female presents to the clinic for evaluation of cough x  7-8 days. The patient also reports sinus congestion, chills, sore throat, WARREN. The patient has taken albuterol inhaler for symptoms. The patient reports unchanged symptoms over time. The patient reports ill exposure (family). The patient denies headache, rash, and fever. The patient also denies chest pain, abdominal pain, wheezing, and nausea / vomiting / diarrhea.    ROS:   Unless otherwise stated in this report the patient's positive and negative responses for review of systems for constitutional, eyes, ENT, cardiovascular, respiratory, gastrointestinal, neurological, , musculoskeletal, and integument systems and related systems to the presenting problem are either stated in the history of present illness or were not pertinent or were negative for the symptoms and/or complaints related to the presenting medical problem.  Positives and pertinent negatives as per HPI.  All others reviewed and are negative.      PMH:     Past Medical History:   Diagnosis Date    Abnormal Pap smear of cervix     normal since    Asthma     Chronic back pain     Chronic generalized pain disorder 2021    Chronic sinusitis     with facial pain    Class 3 severe obesity due to excess calories without serious comorbidity with body mass index (BMI) of 40.0 to 44.9 in adult     DDD (degenerative disc disease), cervical 2021    Depression 2011    Essential hypertension     Gastroesophageal reflux disease without esophagitis     Hemochromatosis     Invasive ductal carcinoma of breast (HCC) 2024    Migraines     born with heart murmur    Paresthesias 08/10/2021    resolved    Seasonal allergies        Past Surgical History:   Procedure Laterality Date    ANAL FISTULOTOMY 
Head is atraumatic. Head shape is symmetrical.

## 2025-01-24 NOTE — PATIENT PROFILE ADULT. - NS PRO REFERRAL CMGT
Patient called requesting refill for losartan. Patient made aware medication was refilled on 10/29/2024 for 90 with  3 refills to [facility] pharmacy. Patient instructed to contact the pharmacy to obtain refills of medication. Patient verbalized understanding.    
None

## 2025-06-01 NOTE — PHYSICAL THERAPY INITIAL EVALUATION ADULT - PERTINENT HX OF CURRENT PROBLEM, REHAB EVAL
As per H&P: "patient has had an episode of hypoglycemia last Sunday ems called to house and was given glucagon and recovered ,  , then today confused , and with change in mental status ,no syncope , and with low fs glucometer test and brought to er , given d50 and  , later in er alert and responsive but fuggy , eating some cookies, pt /c difficulties ambulating" Home